# Patient Record
Sex: FEMALE | Race: WHITE | NOT HISPANIC OR LATINO
[De-identification: names, ages, dates, MRNs, and addresses within clinical notes are randomized per-mention and may not be internally consistent; named-entity substitution may affect disease eponyms.]

---

## 2017-01-09 ENCOUNTER — APPOINTMENT (OUTPATIENT)
Dept: PULMONOLOGY | Facility: CLINIC | Age: 62
End: 2017-01-09

## 2017-03-20 ENCOUNTER — APPOINTMENT (OUTPATIENT)
Dept: PULMONOLOGY | Facility: CLINIC | Age: 62
End: 2017-03-20

## 2017-06-05 ENCOUNTER — APPOINTMENT (OUTPATIENT)
Dept: PULMONOLOGY | Facility: CLINIC | Age: 62
End: 2017-06-05

## 2019-04-12 ENCOUNTER — EMERGENCY (EMERGENCY)
Facility: HOSPITAL | Age: 64
LOS: 1 days | Discharge: HOME | End: 2019-04-12
Attending: EMERGENCY MEDICINE | Admitting: EMERGENCY MEDICINE
Payer: MEDICARE

## 2019-04-12 VITALS
WEIGHT: 244.93 LBS | HEIGHT: 70 IN | RESPIRATION RATE: 20 BRPM | DIASTOLIC BLOOD PRESSURE: 84 MMHG | TEMPERATURE: 99 F | OXYGEN SATURATION: 94 % | HEART RATE: 89 BPM | SYSTOLIC BLOOD PRESSURE: 132 MMHG

## 2019-04-12 VITALS
SYSTOLIC BLOOD PRESSURE: 135 MMHG | TEMPERATURE: 98 F | HEART RATE: 81 BPM | DIASTOLIC BLOOD PRESSURE: 85 MMHG | RESPIRATION RATE: 18 BRPM | OXYGEN SATURATION: 96 %

## 2019-04-12 LAB
ALBUMIN SERPL ELPH-MCNC: 4.1 G/DL — SIGNIFICANT CHANGE UP (ref 3.3–5)
ALP SERPL-CCNC: 46 U/L — SIGNIFICANT CHANGE UP (ref 40–120)
ALT FLD-CCNC: 16 U/L — SIGNIFICANT CHANGE UP (ref 10–45)
ANION GAP SERPL CALC-SCNC: 9 MMOL/L — SIGNIFICANT CHANGE UP (ref 5–17)
AST SERPL-CCNC: 14 U/L — SIGNIFICANT CHANGE UP (ref 10–40)
BASOPHILS # BLD AUTO: 0.05 K/UL — SIGNIFICANT CHANGE UP (ref 0–0.2)
BASOPHILS NFR BLD AUTO: 0.5 % — SIGNIFICANT CHANGE UP (ref 0–2)
BILIRUB SERPL-MCNC: 0.3 MG/DL — SIGNIFICANT CHANGE UP (ref 0.2–1.2)
BUN SERPL-MCNC: 19 MG/DL — SIGNIFICANT CHANGE UP (ref 7–23)
CALCIUM SERPL-MCNC: 10.2 MG/DL — SIGNIFICANT CHANGE UP (ref 8.4–10.5)
CHLORIDE SERPL-SCNC: 108 MMOL/L — SIGNIFICANT CHANGE UP (ref 96–108)
CO2 SERPL-SCNC: 23 MMOL/L — SIGNIFICANT CHANGE UP (ref 22–31)
CREAT SERPL-MCNC: 0.63 MG/DL — SIGNIFICANT CHANGE UP (ref 0.5–1.3)
CRP SERPL-MCNC: 1.03 MG/DL — HIGH (ref 0–0.4)
EOSINOPHIL # BLD AUTO: 0.24 K/UL — SIGNIFICANT CHANGE UP (ref 0–0.5)
EOSINOPHIL NFR BLD AUTO: 2.2 % — SIGNIFICANT CHANGE UP (ref 0–6)
ERYTHROCYTE [SEDIMENTATION RATE] IN BLOOD: 21 MM/HR — SIGNIFICANT CHANGE UP
GLUCOSE SERPL-MCNC: 83 MG/DL — SIGNIFICANT CHANGE UP (ref 70–99)
HCT VFR BLD CALC: 45.8 % — HIGH (ref 34.5–45)
HGB BLD-MCNC: 15.3 G/DL — SIGNIFICANT CHANGE UP (ref 11.5–15.5)
IMM GRANULOCYTES NFR BLD AUTO: 0.5 % — SIGNIFICANT CHANGE UP (ref 0–1.5)
LYMPHOCYTES # BLD AUTO: 2.95 K/UL — SIGNIFICANT CHANGE UP (ref 1–3.3)
LYMPHOCYTES # BLD AUTO: 27.4 % — SIGNIFICANT CHANGE UP (ref 13–44)
MCHC RBC-ENTMCNC: 31.4 PG — SIGNIFICANT CHANGE UP (ref 27–34)
MCHC RBC-ENTMCNC: 33.4 GM/DL — SIGNIFICANT CHANGE UP (ref 32–36)
MCV RBC AUTO: 94 FL — SIGNIFICANT CHANGE UP (ref 80–100)
MONOCYTES # BLD AUTO: 0.87 K/UL — SIGNIFICANT CHANGE UP (ref 0–0.9)
MONOCYTES NFR BLD AUTO: 8.1 % — SIGNIFICANT CHANGE UP (ref 2–14)
NEUTROPHILS # BLD AUTO: 6.61 K/UL — SIGNIFICANT CHANGE UP (ref 1.8–7.4)
NEUTROPHILS NFR BLD AUTO: 61.3 % — SIGNIFICANT CHANGE UP (ref 43–77)
NRBC # BLD: 0 /100 WBCS — SIGNIFICANT CHANGE UP (ref 0–0)
PLATELET # BLD AUTO: 206 K/UL — SIGNIFICANT CHANGE UP (ref 150–400)
POTASSIUM SERPL-MCNC: 4.2 MMOL/L — SIGNIFICANT CHANGE UP (ref 3.5–5.3)
POTASSIUM SERPL-SCNC: 4.2 MMOL/L — SIGNIFICANT CHANGE UP (ref 3.5–5.3)
PROT SERPL-MCNC: 7.5 G/DL — SIGNIFICANT CHANGE UP (ref 6–8.3)
RBC # BLD: 4.87 M/UL — SIGNIFICANT CHANGE UP (ref 3.8–5.2)
RBC # FLD: 12.9 % — SIGNIFICANT CHANGE UP (ref 10.3–14.5)
SODIUM SERPL-SCNC: 140 MMOL/L — SIGNIFICANT CHANGE UP (ref 135–145)
URATE SERPL-MCNC: 4.4 MG/DL — SIGNIFICANT CHANGE UP (ref 2.5–7)
WBC # BLD: 10.77 K/UL — HIGH (ref 3.8–10.5)
WBC # FLD AUTO: 10.77 K/UL — HIGH (ref 3.8–10.5)

## 2019-04-12 PROCEDURE — 73562 X-RAY EXAM OF KNEE 3: CPT | Mod: 26,LT

## 2019-04-12 PROCEDURE — 96374 THER/PROPH/DIAG INJ IV PUSH: CPT

## 2019-04-12 PROCEDURE — 84550 ASSAY OF BLOOD/URIC ACID: CPT

## 2019-04-12 PROCEDURE — 85025 COMPLETE CBC W/AUTO DIFF WBC: CPT

## 2019-04-12 PROCEDURE — 99284 EMERGENCY DEPT VISIT MOD MDM: CPT | Mod: 25

## 2019-04-12 PROCEDURE — 93971 EXTREMITY STUDY: CPT | Mod: 26,LT

## 2019-04-12 PROCEDURE — 36415 COLL VENOUS BLD VENIPUNCTURE: CPT

## 2019-04-12 PROCEDURE — 99284 EMERGENCY DEPT VISIT MOD MDM: CPT

## 2019-04-12 PROCEDURE — 80053 COMPREHEN METABOLIC PANEL: CPT

## 2019-04-12 PROCEDURE — 85652 RBC SED RATE AUTOMATED: CPT

## 2019-04-12 PROCEDURE — 73502 X-RAY EXAM HIP UNI 2-3 VIEWS: CPT | Mod: 26,LT

## 2019-04-12 PROCEDURE — 73502 X-RAY EXAM HIP UNI 2-3 VIEWS: CPT

## 2019-04-12 PROCEDURE — 93971 EXTREMITY STUDY: CPT

## 2019-04-12 PROCEDURE — 86140 C-REACTIVE PROTEIN: CPT

## 2019-04-12 PROCEDURE — 73562 X-RAY EXAM OF KNEE 3: CPT

## 2019-04-12 RX ORDER — IBUPROFEN 200 MG
1 TABLET ORAL
Qty: 42 | Refills: 0
Start: 2019-04-12 | End: 2019-04-25

## 2019-04-12 RX ORDER — KETOROLAC TROMETHAMINE 30 MG/ML
15 SYRINGE (ML) INJECTION ONCE
Qty: 0 | Refills: 0 | Status: DISCONTINUED | OUTPATIENT
Start: 2019-04-12 | End: 2019-04-12

## 2019-04-12 RX ADMIN — Medication 15 MILLIGRAM(S): at 12:38

## 2019-04-12 NOTE — ED ADULT TRIAGE NOTE - CHIEF COMPLAINT QUOTE
pt c/o bilateral leg pain, swelling, but mostly to the left leg, with difficulty bearing weight for the past 5 days. pt reports tingling to the left leg. Has been evaluated multiple times but pain still continues. denies falls or recent injuries. pt c/o bilateral leg pain, swelling, but mostly to the left leg, with difficulty bearing weight for the past 5 days. pt reports tingling to the left leg. Has been evaluated multiple times but pain still continues. denies falls or recent injuries.f= pt c/o bilateral leg pain, swelling, but mostly to the left leg, with difficulty bearing weight for the past 5 days. pt reports tingling to the left leg. Has been evaluated multiple times but pain still continues. denies falls or recent injuries

## 2019-04-12 NOTE — ED PROVIDER NOTE - OBJECTIVE STATEMENT
64 y/o female with a PMHx of HTN, COPD and arthritis is present in the ED c/o left leg pain x1 week. Pt describes a constant aching pain that is located all throughout her left leg. The pain has worsen causing her difficulty with ambulating. She reports she had somewhat slightly similar problem to the right leg and was diagnosed with arthritis, however the pain on the left is worst. She denies the following: sob, chest pain, numbness/tingling, fall, injury, redness. Pt reports noting swelling located over her knee.

## 2019-04-12 NOTE — ED ADULT NURSE NOTE - OBJECTIVE STATEMENT
Patient is a 62yo female reporting left leg pain, swelling, and tingling for weeks, with increasing pain recently. Denies any falls/injuries. Patient unable to bear weight on left leg. Pedal pulses 2+ bilaterally. Patient is a 62yo female reporting left leg pain, swelling, and tingling for a week, with increasing pain in last few days. Denies any falls/injuries. Patient unable to bear weight on left leg. Pedal pulses 2+ bilaterally.

## 2019-04-12 NOTE — ED PROVIDER NOTE - CLINICAL SUMMARY MEDICAL DECISION MAKING FREE TEXT BOX
62 y/o female with left lower leg pain x1 week. VS nml. Labs nml. Xray mild arthritis, US negative for dvt. Pain treated and given cane for safe ambulation. Advised to follow-up with PMD for further.

## 2019-04-12 NOTE — ED ADULT NURSE NOTE - CHIEF COMPLAINT QUOTE
pt c/o bilateral leg pain, swelling, but mostly to the left leg, with difficulty bearing weight for the past 5 days. pt reports tingling to the left leg. Has been evaluated multiple times but pain still continues. denies falls or recent injuries

## 2019-04-12 NOTE — ED PROVIDER NOTE - NSFOLLOWUPINSTRUCTIONS_ED_ALL_ED_FT
Please follow up with your PMD. Return to the Emergency Department if you have any new or worsening symptoms, or if you have any concerns.    Leg Pain    WHAT YOU NEED TO KNOW:    Leg pain may be caused by a variety of health conditions. Your tests did not show any broken bones or blood clots.    DISCHARGE INSTRUCTIONS:    Return to the emergency department if:     You have a fever.      Your leg starts to swell.      Your leg pain gets worse.      You have numbness or tingling in your leg or toes.      You cannot put any weight on or move your leg.    Contact your healthcare provider if:     Your pain does not decrease, even after treatment.      You have questions or concerns about your condition or care.    Medicines:     NSAIDs, such as ibuprofen, help decrease swelling, pain, and fever. This medicine is available with or without a doctor's order. NSAIDs can cause stomach bleeding or kidney problems in certain people. If you take blood thinner medicine, always ask your healthcare provider if NSAIDs are safe for you. Always read the medicine label and follow directions.      Take your medicine as directed. Contact your healthcare provider if you think your medicine is not helping or if you have side effects. Tell him of her if you are allergic to any medicine. Keep a list of the medicines, vitamins, and herbs you take. Include the amounts, and when and why you take them. Bring the list or the pill bottles to follow-up visits. Carry your medicine list with you in case of an emergency.    Follow up with your healthcare provider as directed: You may need more tests to find the cause of your leg pain. You may need to see an orthopedic specialist or a physical therapist. Write down your questions so you remember to ask them during your visits.    Manage your leg pain:     Rest your injured leg so that it can heal. You may need an immobilizer, brace, or splint to limit the movement of your leg. You may need to avoid putting any weight on your leg for at least 48 hours. Return to normal activities as directed.      Ice the injury for 20 minutes every 4 hours for up to 24 hours, or as directed. Use an ice pack, or put crushed ice in a plastic bag. Cover it with a towel to protect your skin. Ice helps prevent tissue damage and decreases swelling and pain.      Elevate your injured leg above the level of your heart as often as you can. This will help decrease swelling and pain. If possible, prop your leg on pillows or blankets to keep the area elevated comfortably.       Use assistive devices as directed. You may need to use a cane or crutches. Assistive devices help decrease pain and pressure on your leg when you walk. Ask your healthcare provider for more information about assistive devices and how to use them correctly.      Maintain a healthy weight. Extra body weight can cause pressure and pain in your hip, knee, and ankle joints. Ask your healthcare provider how much you should weigh. Ask him to help you create a weight loss plan if you are overweight.

## 2019-04-12 NOTE — ED ADULT NURSE NOTE - INTERVENTIONS DEFINITIONS
Stretcher in lowest position, wheels locked, appropriate side rails in place/Physically safe environment: no spills, clutter or unnecessary equipment/Provide visual cue, wrist band, yellow gown, etc./Monitor gait and stability/Instruct patient to call for assistance

## 2019-04-16 DIAGNOSIS — M79.605 PAIN IN LEFT LEG: ICD-10-CM

## 2019-04-16 DIAGNOSIS — R26.2 DIFFICULTY IN WALKING, NOT ELSEWHERE CLASSIFIED: ICD-10-CM

## 2019-04-16 DIAGNOSIS — F17.210 NICOTINE DEPENDENCE, CIGARETTES, UNCOMPLICATED: ICD-10-CM

## 2019-04-16 DIAGNOSIS — I10 ESSENTIAL (PRIMARY) HYPERTENSION: ICD-10-CM

## 2019-04-16 DIAGNOSIS — M25.462 EFFUSION, LEFT KNEE: ICD-10-CM

## 2019-04-16 DIAGNOSIS — M79.662 PAIN IN LEFT LOWER LEG: ICD-10-CM

## 2019-06-03 PROBLEM — I10 ESSENTIAL (PRIMARY) HYPERTENSION: Chronic | Status: ACTIVE | Noted: 2019-04-12

## 2019-06-07 ENCOUNTER — APPOINTMENT (OUTPATIENT)
Dept: INTERNAL MEDICINE | Facility: CLINIC | Age: 64
End: 2019-06-07
Payer: MEDICARE

## 2019-06-07 VITALS
TEMPERATURE: 98.2 F | HEART RATE: 97 BPM | HEIGHT: 70 IN | DIASTOLIC BLOOD PRESSURE: 80 MMHG | SYSTOLIC BLOOD PRESSURE: 122 MMHG | WEIGHT: 270 LBS | OXYGEN SATURATION: 95 % | RESPIRATION RATE: 14 BRPM | BODY MASS INDEX: 38.65 KG/M2

## 2019-06-07 PROCEDURE — 99213 OFFICE O/P EST LOW 20 MIN: CPT | Mod: 25

## 2019-06-07 PROCEDURE — 36415 COLL VENOUS BLD VENIPUNCTURE: CPT

## 2019-06-07 PROCEDURE — 99203 OFFICE O/P NEW LOW 30 MIN: CPT | Mod: 25

## 2019-06-08 NOTE — HISTORY OF PRESENT ILLNESS
[FreeTextEntry1] : pt here for checkup.\par complaining of pain of the knees. [de-identified] : 64 yo wf with hx of HTN,hyperlipidemia ,osteoarthritis and depression, came to the office complaining of pain of the knees mostly left knee.pt was seen and f/u  by orthopedic.

## 2019-06-08 NOTE — PHYSICAL EXAM
[No Acute Distress] : no acute distress [Well Nourished] : well nourished [Well Developed] : well developed [Normal Sclera/Conjunctiva] : normal sclera/conjunctiva [Well-Appearing] : well-appearing [PERRL] : pupils equal round and reactive to light [Normal Oropharynx] : the oropharynx was normal [Normal Outer Ear/Nose] : the outer ears and nose were normal in appearance [EOMI] : extraocular movements intact [Supple] : supple [No Lymphadenopathy] : no lymphadenopathy [No JVD] : no jugular venous distention [No Respiratory Distress] : no respiratory distress  [Clear to Auscultation] : lungs were clear to auscultation bilaterally [Thyroid Normal, No Nodules] : the thyroid was normal and there were no nodules present [No Accessory Muscle Use] : no accessory muscle use [Normal Rate] : normal rate  [Regular Rhythm] : with a regular rhythm [Normal S1, S2] : normal S1 and S2 [No Carotid Bruits] : no carotid bruits [No Murmur] : no murmur heard [No Abdominal Bruit] : a ~M bruit was not heard ~T in the abdomen [Pedal Pulses Present] : the pedal pulses are present [No Edema] : there was no peripheral edema [No Varicosities] : no varicosities [No Extremity Clubbing/Cyanosis] : no extremity clubbing/cyanosis [Normal Appearance] : normal in appearance [No Palpable Aorta] : no palpable aorta [No Nipple Discharge] : no nipple discharge [No Axillary Lymphadenopathy] : no axillary lymphadenopathy [Non Tender] : non-tender [No Masses] : no abdominal mass palpated [Soft] : abdomen soft [Non-distended] : non-distended [No HSM] : no HSM [Normal Bowel Sounds] : normal bowel sounds [Normal Posterior Cervical Nodes] : no posterior cervical lymphadenopathy [Normal Anterior Cervical Nodes] : no anterior cervical lymphadenopathy [No CVA Tenderness] : no CVA  tenderness [Grossly Normal Strength/Tone] : grossly normal strength/tone [No Rash] : no rash [No Spinal Tenderness] : no spinal tenderness [Coordination Grossly Intact] : coordination grossly intact [Normal Gait] : normal gait [No Focal Deficits] : no focal deficits [Deep Tendon Reflexes (DTR)] : deep tendon reflexes were 2+ and symmetric [Normal Affect] : the affect was normal [Normal Insight/Judgement] : insight and judgment were intact [FreeTextEntry1] : deferred [de-identified] : obese [de-identified] : swelling left knee

## 2019-06-08 NOTE — HEALTH RISK ASSESSMENT
[Good] : ~his/her~  mood as  good [No falls in past year] : Patient reported no falls in the past year [1] : 1) Little interest or pleasure doing things for several days (1) [0] : 2) Feeling down, depressed, or hopeless: Not at all (0) [] : No [de-identified] : none [de-identified] : low fat, low salt

## 2019-06-11 LAB
ALBUMIN SERPL ELPH-MCNC: 4.2 G/DL
ALP BLD-CCNC: 46 U/L
ALT SERPL-CCNC: 15 U/L
ANION GAP SERPL CALC-SCNC: 12 MMOL/L
APPEARANCE: CLEAR
AST SERPL-CCNC: 16 U/L
BACTERIA: NEGATIVE
BASOPHILS # BLD AUTO: 0.03 K/UL
BASOPHILS NFR BLD AUTO: 0.3 %
BILIRUB SERPL-MCNC: 0.3 MG/DL
BILIRUBIN URINE: NEGATIVE
BLOOD URINE: NEGATIVE
BUN SERPL-MCNC: 23 MG/DL
CALCIUM SERPL-MCNC: 10.3 MG/DL
CHLORIDE SERPL-SCNC: 106 MMOL/L
CHOLEST SERPL-MCNC: 167 MG/DL
CHOLEST/HDLC SERPL: 3.9 RATIO
CO2 SERPL-SCNC: 25 MMOL/L
COLOR: YELLOW
CREAT SERPL-MCNC: 0.83 MG/DL
EOSINOPHIL # BLD AUTO: 0.17 K/UL
EOSINOPHIL NFR BLD AUTO: 1.6 %
GLUCOSE QUALITATIVE U: NEGATIVE
GLUCOSE SERPL-MCNC: 103 MG/DL
HCT VFR BLD CALC: 46 %
HDLC SERPL-MCNC: 43 MG/DL
HGB BLD-MCNC: 14.3 G/DL
HYALINE CASTS: 0 /LPF
IMM GRANULOCYTES NFR BLD AUTO: 0.4 %
KETONES URINE: NORMAL
LDLC SERPL CALC-MCNC: 94 MG/DL
LEUKOCYTE ESTERASE URINE: NEGATIVE
LYMPHOCYTES # BLD AUTO: 2.69 K/UL
LYMPHOCYTES NFR BLD AUTO: 25.8 %
MAN DIFF?: NORMAL
MCHC RBC-ENTMCNC: 30.7 PG
MCHC RBC-ENTMCNC: 31.1 GM/DL
MCV RBC AUTO: 98.7 FL
MICROSCOPIC-UA: NORMAL
MONOCYTES # BLD AUTO: 0.79 K/UL
MONOCYTES NFR BLD AUTO: 7.6 %
NEUTROPHILS # BLD AUTO: 6.71 K/UL
NEUTROPHILS NFR BLD AUTO: 64.3 %
NITRITE URINE: NEGATIVE
PH URINE: 6
PLATELET # BLD AUTO: 239 K/UL
POTASSIUM SERPL-SCNC: 4.3 MMOL/L
PROT SERPL-MCNC: 6.6 G/DL
PROTEIN URINE: ABNORMAL
RBC # BLD: 4.66 M/UL
RBC # FLD: 13.9 %
RED BLOOD CELLS URINE: 1 /HPF
SODIUM SERPL-SCNC: 143 MMOL/L
SPECIFIC GRAVITY URINE: 1.03
SQUAMOUS EPITHELIAL CELLS: 7 /HPF
TRIGL SERPL-MCNC: 152 MG/DL
UROBILINOGEN URINE: ABNORMAL
WBC # FLD AUTO: 10.43 K/UL
WHITE BLOOD CELLS URINE: 3 /HPF

## 2019-06-19 RX ORDER — FLUOXETINE HYDROCHLORIDE 40 MG/1
40 CAPSULE ORAL
Qty: 90 | Refills: 3 | Status: ACTIVE | COMMUNITY
Start: 1900-01-01 | End: 1900-01-01

## 2019-10-29 ENCOUNTER — APPOINTMENT (OUTPATIENT)
Dept: INTERNAL MEDICINE | Facility: CLINIC | Age: 64
End: 2019-10-29
Payer: MEDICARE

## 2019-10-29 VITALS
TEMPERATURE: 99.4 F | WEIGHT: 277 LBS | HEIGHT: 70 IN | BODY MASS INDEX: 39.65 KG/M2 | SYSTOLIC BLOOD PRESSURE: 135 MMHG | HEART RATE: 89 BPM | OXYGEN SATURATION: 93 % | DIASTOLIC BLOOD PRESSURE: 81 MMHG | RESPIRATION RATE: 15 BRPM

## 2019-10-29 PROCEDURE — 99214 OFFICE O/P EST MOD 30 MIN: CPT

## 2019-10-29 NOTE — HISTORY OF PRESENT ILLNESS
[FreeTextEntry8] : 63 YO WF CAME TO THE OFFICE COMPLAINING OF A HEADACHE FOR ONE WEEK. PT STATES THAT SHE DIDNT TAKE HER MEDICATIONS FOR HIGH BLOOD PRESSURE FOR ONE WEEK

## 2020-01-27 ENCOUNTER — APPOINTMENT (OUTPATIENT)
Dept: INTERNAL MEDICINE | Facility: CLINIC | Age: 65
End: 2020-01-27

## 2020-05-19 ENCOUNTER — NON-APPOINTMENT (OUTPATIENT)
Age: 65
End: 2020-05-19

## 2020-05-19 ENCOUNTER — APPOINTMENT (OUTPATIENT)
Dept: INTERNAL MEDICINE | Facility: CLINIC | Age: 65
End: 2020-05-19
Payer: MEDICARE

## 2020-05-19 VITALS
WEIGHT: 286 LBS | SYSTOLIC BLOOD PRESSURE: 130 MMHG | RESPIRATION RATE: 15 BRPM | DIASTOLIC BLOOD PRESSURE: 87 MMHG | OXYGEN SATURATION: 94 % | HEIGHT: 70 IN | TEMPERATURE: 98.6 F | BODY MASS INDEX: 40.94 KG/M2 | HEART RATE: 89 BPM

## 2020-05-19 PROCEDURE — G0439: CPT

## 2020-05-19 PROCEDURE — 93000 ELECTROCARDIOGRAM COMPLETE: CPT

## 2020-05-19 PROCEDURE — 36415 COLL VENOUS BLD VENIPUNCTURE: CPT

## 2020-05-20 NOTE — HISTORY OF PRESENT ILLNESS
[FreeTextEntry1] : PHYSICAL \par NO SPECIFIC COMPLAINTS [de-identified] : 65 YO WF WITH HX OF HTN,HYPERLIPIDEMIA,COPD,ANXIETY DEPRESSION ,OSTEOARTHRITIS, CAME TO THE OFFICE FOR PHYSICAL\par

## 2020-05-20 NOTE — PHYSICAL EXAM
[No Acute Distress] : no acute distress [Well Nourished] : well nourished [Well Developed] : well developed [Well-Appearing] : well-appearing [EOMI] : extraocular movements intact [Normal Sclera/Conjunctiva] : normal sclera/conjunctiva [PERRL] : pupils equal round and reactive to light [Normal Outer Ear/Nose] : the outer ears and nose were normal in appearance [Normal Oropharynx] : the oropharynx was normal [No JVD] : no jugular venous distention [Supple] : supple [No Lymphadenopathy] : no lymphadenopathy [Thyroid Normal, No Nodules] : the thyroid was normal and there were no nodules present [No Respiratory Distress] : no respiratory distress  [No Accessory Muscle Use] : no accessory muscle use [Clear to Auscultation] : lungs were clear to auscultation bilaterally [Normal Rate] : normal rate  [Regular Rhythm] : with a regular rhythm [Normal S1, S2] : normal S1 and S2 [No Murmur] : no murmur heard [No Carotid Bruits] : no carotid bruits [No Varicosities] : no varicosities [No Abdominal Bruit] : a ~M bruit was not heard ~T in the abdomen [Pedal Pulses Present] : the pedal pulses are present [No Edema] : there was no peripheral edema [No Palpable Aorta] : no palpable aorta [Normal Appearance] : normal in appearance [No Extremity Clubbing/Cyanosis] : no extremity clubbing/cyanosis [No Nipple Discharge] : no nipple discharge [No Axillary Lymphadenopathy] : no axillary lymphadenopathy [Non Tender] : non-tender [Soft] : abdomen soft [Non-distended] : non-distended [No Masses] : no abdominal mass palpated [Normal Bowel Sounds] : normal bowel sounds [No HSM] : no HSM [Normal Posterior Cervical Nodes] : no posterior cervical lymphadenopathy [Normal Anterior Cervical Nodes] : no anterior cervical lymphadenopathy [No CVA Tenderness] : no CVA  tenderness [No Joint Swelling] : no joint swelling [No Spinal Tenderness] : no spinal tenderness [Grossly Normal Strength/Tone] : grossly normal strength/tone [No Rash] : no rash [Coordination Grossly Intact] : coordination grossly intact [Normal Gait] : normal gait [No Focal Deficits] : no focal deficits [Deep Tendon Reflexes (DTR)] : deep tendon reflexes were 2+ and symmetric [Normal Affect] : the affect was normal [Normal Insight/Judgement] : insight and judgment were intact [FreeTextEntry1] : DEFERRED

## 2020-05-20 NOTE — HEALTH RISK ASSESSMENT
[Good] : ~his/her~  mood as  good [] : Yes [No falls in past year] : Patient reported no falls in the past year [No] : In the past 12 months have you used drugs other than those required for medical reasons? No [0] : 2) Feeling down, depressed, or hopeless: Not at all (0) [Audit-CScore] : 0

## 2020-05-22 LAB
ALBUMIN SERPL ELPH-MCNC: 4.6 G/DL
ALP BLD-CCNC: 56 U/L
ALT SERPL-CCNC: 18 U/L
ANION GAP SERPL CALC-SCNC: 12 MMOL/L
APPEARANCE: ABNORMAL
AST SERPL-CCNC: 14 U/L
BACTERIA: ABNORMAL
BASOPHILS # BLD AUTO: 0.06 K/UL
BASOPHILS NFR BLD AUTO: 0.5 %
BILIRUB SERPL-MCNC: 0.4 MG/DL
BILIRUBIN URINE: NEGATIVE
BLOOD URINE: ABNORMAL
BUN SERPL-MCNC: 14 MG/DL
CALCIUM SERPL-MCNC: 9.9 MG/DL
CHLORIDE SERPL-SCNC: 105 MMOL/L
CHOLEST SERPL-MCNC: 218 MG/DL
CHOLEST/HDLC SERPL: 5.1 RATIO
CO2 SERPL-SCNC: 25 MMOL/L
COLOR: YELLOW
CREAT SERPL-MCNC: 0.62 MG/DL
EOSINOPHIL # BLD AUTO: 0.23 K/UL
EOSINOPHIL NFR BLD AUTO: 2 %
ESTIMATED AVERAGE GLUCOSE: 134 MG/DL
GLUCOSE BS SERPL-MCNC: 77 MG/DL
GLUCOSE QUALITATIVE U: NEGATIVE
GLUCOSE SERPL-MCNC: 93 MG/DL
HBA1C MFR BLD HPLC: 6.3 %
HCT VFR BLD CALC: 50.9 %
HDLC SERPL-MCNC: 43 MG/DL
HGB BLD-MCNC: 16.3 G/DL
HYALINE CASTS: 2 /LPF
IMM GRANULOCYTES NFR BLD AUTO: 0.2 %
KETONES URINE: NEGATIVE
LDLC SERPL CALC-MCNC: 128 MG/DL
LEUKOCYTE ESTERASE URINE: ABNORMAL
LYMPHOCYTES # BLD AUTO: 3.64 K/UL
LYMPHOCYTES NFR BLD AUTO: 31.9 %
MAN DIFF?: NORMAL
MCHC RBC-ENTMCNC: 30.6 PG
MCHC RBC-ENTMCNC: 32 GM/DL
MCV RBC AUTO: 95.7 FL
MICROSCOPIC-UA: NORMAL
MONOCYTES # BLD AUTO: 0.85 K/UL
MONOCYTES NFR BLD AUTO: 7.5 %
NEUTROPHILS # BLD AUTO: 6.6 K/UL
NEUTROPHILS NFR BLD AUTO: 57.9 %
NITRITE URINE: NEGATIVE
PH URINE: 6.5
PLATELET # BLD AUTO: 258 K/UL
POTASSIUM SERPL-SCNC: 4.4 MMOL/L
PROT SERPL-MCNC: 7.3 G/DL
PROTEIN URINE: ABNORMAL
RBC # BLD: 5.32 M/UL
RBC # FLD: 13.7 %
RED BLOOD CELLS URINE: 10 /HPF
SARS-COV-2 IGG SERPL IA-ACNC: 0.1 INDEX
SARS-COV-2 IGG SERPL QL IA: NEGATIVE
SODIUM SERPL-SCNC: 142 MMOL/L
SPECIFIC GRAVITY URINE: 1.01
SQUAMOUS EPITHELIAL CELLS: 18 /HPF
T3 SERPL-MCNC: 135 NG/DL
T4 FREE SERPL-MCNC: 1 NG/DL
T4 SERPL-MCNC: 8.1 UG/DL
TRIGL SERPL-MCNC: 234 MG/DL
TSH SERPL-ACNC: 0.59 UIU/ML
UROBILINOGEN URINE: NORMAL
WBC # FLD AUTO: 11.4 K/UL
WHITE BLOOD CELLS URINE: 87 /HPF

## 2020-05-26 DIAGNOSIS — R82.90 UNSPECIFIED ABNORMAL FINDINGS IN URINE: ICD-10-CM

## 2020-05-28 LAB
APPEARANCE: ABNORMAL
BACTERIA UR CULT: ABNORMAL
BACTERIA: ABNORMAL
BILIRUBIN URINE: NEGATIVE
BLOOD URINE: ABNORMAL
COLOR: YELLOW
GLUCOSE QUALITATIVE U: NEGATIVE
HYALINE CASTS: 0 /LPF
KETONES URINE: NEGATIVE
LEUKOCYTE ESTERASE URINE: ABNORMAL
MICROSCOPIC-UA: NORMAL
NITRITE URINE: NEGATIVE
PH URINE: 6.5
PROTEIN URINE: ABNORMAL
RED BLOOD CELLS URINE: 11 /HPF
SPECIFIC GRAVITY URINE: 1.02
SQUAMOUS EPITHELIAL CELLS: 6 /HPF
UROBILINOGEN URINE: ABNORMAL
WHITE BLOOD CELLS URINE: 39 /HPF

## 2020-05-29 DIAGNOSIS — Z87.440 PERSONAL HISTORY OF URINARY (TRACT) INFECTIONS: ICD-10-CM

## 2020-06-11 ENCOUNTER — RX RENEWAL (OUTPATIENT)
Age: 65
End: 2020-06-11

## 2020-12-21 ENCOUNTER — APPOINTMENT (OUTPATIENT)
Dept: INTERNAL MEDICINE | Facility: CLINIC | Age: 65
End: 2020-12-21

## 2020-12-23 PROBLEM — Z87.440 HISTORY OF URINARY TRACT INFECTION: Status: RESOLVED | Noted: 2020-05-29 | Resolved: 2020-12-23

## 2020-12-30 ENCOUNTER — APPOINTMENT (OUTPATIENT)
Dept: INTERNAL MEDICINE | Facility: CLINIC | Age: 65
End: 2020-12-30
Payer: MEDICARE

## 2020-12-30 VITALS
TEMPERATURE: 98.4 F | BODY MASS INDEX: 40.37 KG/M2 | RESPIRATION RATE: 15 BRPM | WEIGHT: 282 LBS | OXYGEN SATURATION: 95 % | HEIGHT: 70 IN | HEART RATE: 93 BPM | DIASTOLIC BLOOD PRESSURE: 81 MMHG | SYSTOLIC BLOOD PRESSURE: 137 MMHG

## 2020-12-30 DIAGNOSIS — Z11.59 ENCOUNTER FOR SCREENING FOR OTHER VIRAL DISEASES: ICD-10-CM

## 2020-12-30 PROCEDURE — 99214 OFFICE O/P EST MOD 30 MIN: CPT | Mod: CS

## 2020-12-31 LAB — SARS-COV-2 N GENE NPH QL NAA+PROBE: NOT DETECTED

## 2021-01-02 NOTE — HEALTH RISK ASSESSMENT
[] : Yes [No] : In the past 12 months have you used drugs other than those required for medical reasons? No [No falls in past year] : Patient reported no falls in the past year [0] : 2) Feeling down, depressed, or hopeless: Not at all (0) [1] : 1) Little interest or pleasure doing things for several days (1) [Audit-CScore] : 0 [de-identified] : Sedentary [de-identified] : Regular [[id number]] : 1

## 2021-01-02 NOTE — END OF VISIT
[FreeTextEntry3] : I, Kristyn Aj, personally transcribed these services in the presence of Dr. Yeison Combs MD. I, Dr. Yeison Combs MD, personally performed the services described in this documentation as scribed by Kristyn Aj in my presence and it is both accurate and complete.\par

## 2021-01-02 NOTE — PHYSICAL EXAM
[No Acute Distress] : no acute distress [Well Nourished] : well nourished [Well Developed] : well developed [Well-Appearing] : well-appearing [Normal Sclera/Conjunctiva] : normal sclera/conjunctiva [PERRL] : pupils equal round and reactive to light [EOMI] : extraocular movements intact [Normal Outer Ear/Nose] : the outer ears and nose were normal in appearance [Normal Oropharynx] : the oropharynx was normal [No JVD] : no jugular venous distention [No Lymphadenopathy] : no lymphadenopathy [Supple] : supple [Thyroid Normal, No Nodules] : the thyroid was normal and there were no nodules present [No Respiratory Distress] : no respiratory distress  [No Accessory Muscle Use] : no accessory muscle use [Clear to Auscultation] : lungs were clear to auscultation bilaterally [Normal Rate] : normal rate  [Regular Rhythm] : with a regular rhythm [Normal S1, S2] : normal S1 and S2 [No Murmur] : no murmur heard [No Carotid Bruits] : no carotid bruits [No Abdominal Bruit] : a ~M bruit was not heard ~T in the abdomen [No Varicosities] : no varicosities [Pedal Pulses Present] : the pedal pulses are present [No Edema] : there was no peripheral edema [No Palpable Aorta] : no palpable aorta [No Extremity Clubbing/Cyanosis] : no extremity clubbing/cyanosis [Soft] : abdomen soft [Non Tender] : non-tender [Non-distended] : non-distended [No Masses] : no abdominal mass palpated [No HSM] : no HSM [Normal Bowel Sounds] : normal bowel sounds [Normal Posterior Cervical Nodes] : no posterior cervical lymphadenopathy [Normal Anterior Cervical Nodes] : no anterior cervical lymphadenopathy [No CVA Tenderness] : no CVA  tenderness [No Spinal Tenderness] : no spinal tenderness [No Joint Swelling] : no joint swelling [Grossly Normal Strength/Tone] : grossly normal strength/tone [No Rash] : no rash [Coordination Grossly Intact] : coordination grossly intact [No Focal Deficits] : no focal deficits [Normal Gait] : normal gait [Normal Affect] : the affect was normal [Normal Insight/Judgement] : insight and judgment were intact [de-identified] : obese [de-identified] : Deferred [FreeTextEntry1] : Deferred

## 2021-01-02 NOTE — HISTORY OF PRESENT ILLNESS
[FreeTextEntry1] : Follow-up\par  [de-identified] : EVAN MONTOYA is a 65 year old female with a PMHx of OAD, OA, HTN, HLD, COVID-19 virus infection obesity, obstructive sleep apnea on CPAP, depression, and pneumonia who presents today for follow-up. \par \par Pt was hospitalized at [facility] for a week for COVID-19 virus infection, Pneumonia, and COPD. She was discharged home on 12/26/2020.

## 2021-01-07 ENCOUNTER — APPOINTMENT (OUTPATIENT)
Dept: INTERNAL MEDICINE | Facility: CLINIC | Age: 66
End: 2021-01-07

## 2021-01-07 ENCOUNTER — APPOINTMENT (OUTPATIENT)
Dept: HEART AND VASCULAR | Facility: CLINIC | Age: 66
End: 2021-01-07

## 2021-01-07 ENCOUNTER — APPOINTMENT (OUTPATIENT)
Dept: PULMONOLOGY | Facility: CLINIC | Age: 66
End: 2021-01-07
Payer: MEDICARE

## 2021-01-07 ENCOUNTER — APPOINTMENT (OUTPATIENT)
Dept: INTERNAL MEDICINE | Facility: CLINIC | Age: 66
End: 2021-01-07
Payer: MEDICARE

## 2021-01-07 VITALS
DIASTOLIC BLOOD PRESSURE: 78 MMHG | HEART RATE: 114 BPM | SYSTOLIC BLOOD PRESSURE: 151 MMHG | BODY MASS INDEX: 40.37 KG/M2 | RESPIRATION RATE: 16 BRPM | TEMPERATURE: 98 F | HEIGHT: 70 IN | OXYGEN SATURATION: 94 % | WEIGHT: 282 LBS

## 2021-01-07 DIAGNOSIS — N39.0 URINARY TRACT INFECTION, SITE NOT SPECIFIED: ICD-10-CM

## 2021-01-07 PROCEDURE — 99214 OFFICE O/P EST MOD 30 MIN: CPT | Mod: CS,25

## 2021-01-07 PROCEDURE — 99214 OFFICE O/P EST MOD 30 MIN: CPT | Mod: CS

## 2021-01-07 PROCEDURE — 36415 COLL VENOUS BLD VENIPUNCTURE: CPT

## 2021-01-07 NOTE — REASON FOR VISIT
[Follow-Up - From Hospitalization] : a follow-up visit after a recent hospitalization [Sleep Apnea] : sleep apnea [Cough] : cough [COPD] : COPD [Shortness of Breath] : shortness of breath [Wheezing] : wheezing

## 2021-01-07 NOTE — REVIEW OF SYSTEMS
[Cough] : cough [Hemoptysis] : no hemoptysis [Chest Tightness] : chest tightness [Frequent URIs] : no frequent URIs [Sputum] : sputum [Dyspnea] : dyspnea [Pleuritic Pain] : pleuritic pain [Wheezing] : wheezing [A.M. Dry Mouth] : a.m. dry mouth [SOB on Exertion] : sob on exertion [Negative] : Endocrine

## 2021-01-07 NOTE — HISTORY OF PRESENT ILLNESS
[Current] : current [Never] : never [TextBox_4] : Patient is a 65-year-old morbidly obese female with past medical history significant for COPD, active smoker, hypertension, high cholesterol who was recently discharged from the hospital after a prolonged admission secondary to COVID-19 pneumonia.  The patient presents today for follow-up.  She continues to smoke despite having home oxygen.  She complains of occasional cough shortness of breath and dyspnea on exertion.  She denies any fevers or myalgias

## 2021-01-07 NOTE — ASSESSMENT
[FreeTextEntry1] : Summary the patient is a 65-year-old morbidly obese female with past medical history significant for obstructive sleep apnea, COPD, hypertension, GERD status post COVID-19 pneumonia who presents today for follow-up.  The patient's physical exam is significant for diffuse rhonchi bilaterally.  I had a lengthy discussion with the patient regarding smoking cessation.  Patient is instructed to continue current medications and to follow-up in 3 months.  Prescription renewal performed

## 2021-01-10 NOTE — HEALTH RISK ASSESSMENT
[] : Yes [No] : In the past 12 months have you used drugs other than those required for medical reasons? No [No falls in past year] : Patient reported no falls in the past year [1] : 1) Little interest or pleasure doing things for several days (1) [0] : 2) Feeling down, depressed, or hopeless: Not at all (0) [de-identified] : 6 cigarettes per day [Audit-CScore] : 0 [de-identified] : Sedentary [de-identified] : Regular [[id number]] : 1

## 2021-01-10 NOTE — PHYSICAL EXAM
[No Acute Distress] : no acute distress [Well Nourished] : well nourished [Well Developed] : well developed [Well-Appearing] : well-appearing [Normal Sclera/Conjunctiva] : normal sclera/conjunctiva [PERRL] : pupils equal round and reactive to light [EOMI] : extraocular movements intact [Normal Outer Ear/Nose] : the outer ears and nose were normal in appearance [Normal Oropharynx] : the oropharynx was normal [No JVD] : no jugular venous distention [No Lymphadenopathy] : no lymphadenopathy [Supple] : supple [Thyroid Normal, No Nodules] : the thyroid was normal and there were no nodules present [No Respiratory Distress] : no respiratory distress  [No Accessory Muscle Use] : no accessory muscle use [Clear to Auscultation] : lungs were clear to auscultation bilaterally [Normal Rate] : normal rate  [Regular Rhythm] : with a regular rhythm [Normal S1, S2] : normal S1 and S2 [No Murmur] : no murmur heard [No Carotid Bruits] : no carotid bruits [No Abdominal Bruit] : a ~M bruit was not heard ~T in the abdomen [No Varicosities] : no varicosities [Pedal Pulses Present] : the pedal pulses are present [No Edema] : there was no peripheral edema [No Palpable Aorta] : no palpable aorta [No Extremity Clubbing/Cyanosis] : no extremity clubbing/cyanosis [Soft] : abdomen soft [Non Tender] : non-tender [Non-distended] : non-distended [No Masses] : no abdominal mass palpated [No HSM] : no HSM [Normal Bowel Sounds] : normal bowel sounds [Normal Posterior Cervical Nodes] : no posterior cervical lymphadenopathy [Normal Anterior Cervical Nodes] : no anterior cervical lymphadenopathy [No CVA Tenderness] : no CVA  tenderness [No Spinal Tenderness] : no spinal tenderness [No Joint Swelling] : no joint swelling [Grossly Normal Strength/Tone] : grossly normal strength/tone [No Rash] : no rash [Coordination Grossly Intact] : coordination grossly intact [No Focal Deficits] : no focal deficits [Normal Gait] : normal gait [Normal Affect] : the affect was normal [Normal Insight/Judgement] : insight and judgment were intact [de-identified] : Deferred [de-identified] : Obese [FreeTextEntry1] : Deferred

## 2021-01-10 NOTE — HISTORY OF PRESENT ILLNESS
[FreeTextEntry1] : Follow-up\par  [de-identified] : EVAN MONTOYA is a 65 year old female with a PMHx of HTN, HLD, OAD, OA, Covid-19 virus infection, depression, obesity, pneumonia, and sleep apnea on CPAP who presents today for follow-up post COVID-19 virus infection.\par \par Pt was seen by pulmonary consultant today and was given Spiriva Respimat 2.5 mcg/ actuation samples.\par

## 2021-01-16 LAB
25(OH)D3 SERPL-MCNC: 29.4 NG/ML
ALBUMIN SERPL ELPH-MCNC: 3.8 G/DL
ALP BLD-CCNC: 50 U/L
ALT SERPL-CCNC: 22 U/L
ANION GAP SERPL CALC-SCNC: 14 MMOL/L
APPEARANCE: ABNORMAL
AST SERPL-CCNC: 14 U/L
BACTERIA UR CULT: ABNORMAL
BACTERIA: ABNORMAL
BASOPHILS # BLD AUTO: 0.04 K/UL
BASOPHILS NFR BLD AUTO: 0.5 %
BILIRUB SERPL-MCNC: 0.3 MG/DL
BILIRUBIN URINE: NEGATIVE
BLOOD URINE: ABNORMAL
BUN SERPL-MCNC: 15 MG/DL
CALCIUM OXALATE CRYSTALS: ABNORMAL
CALCIUM SERPL-MCNC: 10.2 MG/DL
CHLORIDE SERPL-SCNC: 103 MMOL/L
CHOLEST SERPL-MCNC: 229 MG/DL
CO2 SERPL-SCNC: 23 MMOL/L
COLOR: YELLOW
CREAT SERPL-MCNC: 0.89 MG/DL
EOSINOPHIL # BLD AUTO: 0.19 K/UL
EOSINOPHIL NFR BLD AUTO: 2.6 %
GLUCOSE QUALITATIVE U: NEGATIVE
GLUCOSE SERPL-MCNC: 164 MG/DL
HCT VFR BLD CALC: 43.6 %
HDLC SERPL-MCNC: 38 MG/DL
HGB BLD-MCNC: 14.3 G/DL
HYALINE CASTS: 0 /LPF
IMM GRANULOCYTES NFR BLD AUTO: 0.3 %
KETONES URINE: NEGATIVE
LDLC SERPL CALC-MCNC: 133 MG/DL
LEUKOCYTE ESTERASE URINE: ABNORMAL
LYMPHOCYTES # BLD AUTO: 2.62 K/UL
LYMPHOCYTES NFR BLD AUTO: 35.7 %
MAN DIFF?: NORMAL
MCHC RBC-ENTMCNC: 31 PG
MCHC RBC-ENTMCNC: 32.8 GM/DL
MCV RBC AUTO: 94.4 FL
MICROSCOPIC-UA: NORMAL
MONOCYTES # BLD AUTO: 0.61 K/UL
MONOCYTES NFR BLD AUTO: 8.3 %
NEUTROPHILS # BLD AUTO: 3.85 K/UL
NEUTROPHILS NFR BLD AUTO: 52.6 %
NITRITE URINE: POSITIVE
NONHDLC SERPL-MCNC: 192 MG/DL
PH URINE: 6
PLATELET # BLD AUTO: 199 K/UL
POTASSIUM SERPL-SCNC: 4.2 MMOL/L
PROT SERPL-MCNC: 7 G/DL
PROTEIN URINE: NORMAL
RBC # BLD: 4.62 M/UL
RBC # FLD: 13.5 %
RED BLOOD CELLS URINE: 6 /HPF
SODIUM SERPL-SCNC: 140 MMOL/L
SPECIFIC GRAVITY URINE: 1.02
SQUAMOUS EPITHELIAL CELLS: 8 /HPF
T3 SERPL-MCNC: 139 NG/DL
T4 FREE SERPL-MCNC: 1.2 NG/DL
T4 SERPL-MCNC: 7.8 UG/DL
TRIGL SERPL-MCNC: 291 MG/DL
TSH SERPL-ACNC: 0.51 UIU/ML
UROBILINOGEN URINE: NORMAL
VIT B12 SERPL-MCNC: 378 PG/ML
WBC # FLD AUTO: 7.33 K/UL
WHITE BLOOD CELLS URINE: 12 /HPF

## 2021-03-12 ENCOUNTER — NON-APPOINTMENT (OUTPATIENT)
Age: 66
End: 2021-03-12

## 2021-03-23 ENCOUNTER — APPOINTMENT (OUTPATIENT)
Dept: PULMONOLOGY | Facility: CLINIC | Age: 66
End: 2021-03-23
Payer: MEDICARE

## 2021-03-23 VITALS
HEART RATE: 96 BPM | RESPIRATION RATE: 16 BRPM | OXYGEN SATURATION: 93 % | TEMPERATURE: 96 F | WEIGHT: 293 LBS | BODY MASS INDEX: 41.95 KG/M2 | DIASTOLIC BLOOD PRESSURE: 91 MMHG | HEIGHT: 70 IN | SYSTOLIC BLOOD PRESSURE: 146 MMHG

## 2021-03-23 DIAGNOSIS — J12.82 COVID-19: ICD-10-CM

## 2021-03-23 DIAGNOSIS — U07.1 COVID-19: ICD-10-CM

## 2021-03-23 PROCEDURE — 99214 OFFICE O/P EST MOD 30 MIN: CPT | Mod: CS

## 2021-03-23 NOTE — REASON FOR VISIT
[Follow-Up] : a follow-up visit [Sleep Apnea] : sleep apnea [Cough] : cough [Shortness of Breath] : shortness of breath

## 2021-03-23 NOTE — ASSESSMENT
[FreeTextEntry1] : Summary the patient is a 65-year-old morbidly obese female with past medical history significant for obstructive sleep apnea, COPD, hypertension, GERD status post COVID-19 pneumonia who presents today for follow-up.  The patient's physical exam is significant for proved air entry bilaterally.  I had a lengthy discussion with the patient regarding smoking cessation.  Patient is instructed to continue current medications and to follow-up in 3 months.  Prescription renewal performed

## 2021-03-23 NOTE — HISTORY OF PRESENT ILLNESS
[Current] : current [Never] : never [TextBox_4] : Patient is a 65-year-old morbidly obese female with past medical history significant for hypertension, high cholesterol, obstructive sleep apnea, depression who was recently discharged from the hospital for Covid.  19 pneumonia.  The patient presents today for further management she states that her shortness of breath dyspnea on exertion have improved.  She does complain that she is unable to lose weight.  She denies fevers chills chest pain weight loss or hemoptysis

## 2021-05-13 ENCOUNTER — APPOINTMENT (OUTPATIENT)
Dept: INTERNAL MEDICINE | Facility: CLINIC | Age: 66
End: 2021-05-13
Payer: MEDICARE

## 2021-05-13 VITALS
OXYGEN SATURATION: 92 % | TEMPERATURE: 98.7 F | SYSTOLIC BLOOD PRESSURE: 145 MMHG | HEART RATE: 95 BPM | BODY MASS INDEX: 41.95 KG/M2 | WEIGHT: 293 LBS | HEIGHT: 70 IN | DIASTOLIC BLOOD PRESSURE: 84 MMHG | RESPIRATION RATE: 16 BRPM

## 2021-05-13 DIAGNOSIS — M19.90 UNSPECIFIED OSTEOARTHRITIS, UNSPECIFIED SITE: ICD-10-CM

## 2021-05-13 DIAGNOSIS — J44.9 CHRONIC OBSTRUCTIVE PULMONARY DISEASE, UNSPECIFIED: ICD-10-CM

## 2021-05-13 DIAGNOSIS — U07.1 COVID-19: ICD-10-CM

## 2021-05-13 DIAGNOSIS — T78.40XA ALLERGY, UNSPECIFIED, INITIAL ENCOUNTER: ICD-10-CM

## 2021-05-13 PROCEDURE — 36415 COLL VENOUS BLD VENIPUNCTURE: CPT

## 2021-05-13 PROCEDURE — 99214 OFFICE O/P EST MOD 30 MIN: CPT | Mod: CS,25

## 2021-05-13 RX ORDER — FLUTICASONE PROPIONATE 50 UG/1
50 SPRAY, METERED NASAL DAILY
Qty: 1 | Refills: 0 | Status: ACTIVE | COMMUNITY
Start: 2021-05-13 | End: 1900-01-01

## 2021-05-14 LAB
25(OH)D3 SERPL-MCNC: 26.2 NG/ML
ALBUMIN SERPL ELPH-MCNC: 4.1 G/DL
ALP BLD-CCNC: 60 U/L
ALT SERPL-CCNC: 14 U/L
ANION GAP SERPL CALC-SCNC: 13 MMOL/L
AST SERPL-CCNC: 13 U/L
BASOPHILS # BLD AUTO: 0.05 K/UL
BASOPHILS NFR BLD AUTO: 0.4 %
BILIRUB SERPL-MCNC: 0.2 MG/DL
BUN SERPL-MCNC: 19 MG/DL
CALCIUM SERPL-MCNC: 10.2 MG/DL
CHLORIDE SERPL-SCNC: 104 MMOL/L
CHOLEST SERPL-MCNC: 228 MG/DL
CO2 SERPL-SCNC: 23 MMOL/L
CREAT SERPL-MCNC: 0.82 MG/DL
EOSINOPHIL # BLD AUTO: 0.2 K/UL
EOSINOPHIL NFR BLD AUTO: 1.7 %
GLUCOSE SERPL-MCNC: 97 MG/DL
HCT VFR BLD CALC: 48.8 %
HDLC SERPL-MCNC: 35 MG/DL
HGB BLD-MCNC: 16 G/DL
IMM GRANULOCYTES NFR BLD AUTO: 0.4 %
LDLC SERPL CALC-MCNC: NORMAL MG/DL
LYMPHOCYTES # BLD AUTO: 3.87 K/UL
LYMPHOCYTES NFR BLD AUTO: 32.5 %
MAN DIFF?: NORMAL
MCHC RBC-ENTMCNC: 30.4 PG
MCHC RBC-ENTMCNC: 32.8 GM/DL
MCV RBC AUTO: 92.8 FL
MONOCYTES # BLD AUTO: 0.87 K/UL
MONOCYTES NFR BLD AUTO: 7.3 %
NEUTROPHILS # BLD AUTO: 6.88 K/UL
NEUTROPHILS NFR BLD AUTO: 57.7 %
NONHDLC SERPL-MCNC: 193 MG/DL
PLATELET # BLD AUTO: 260 K/UL
POTASSIUM SERPL-SCNC: 4.7 MMOL/L
PROT SERPL-MCNC: 7.1 G/DL
RBC # BLD: 5.26 M/UL
RBC # FLD: 13.1 %
SODIUM SERPL-SCNC: 140 MMOL/L
T3 SERPL-MCNC: 123 NG/DL
T4 FREE SERPL-MCNC: 1.1 NG/DL
T4 SERPL-MCNC: 7.7 UG/DL
TRIGL SERPL-MCNC: 410 MG/DL
TSH SERPL-ACNC: 0.39 UIU/ML
WBC # FLD AUTO: 11.92 K/UL

## 2021-05-15 NOTE — HISTORY OF PRESENT ILLNESS
[FreeTextEntry8] : EVAN MONTOYA is a 65 year old female with a PMHx of HTN, HLD, OAD, OA, Pneumonia due to COVID -19 virus (infection), depression, and obesity who presents today complaining of nasal congestion and cough since last week.\par \par Pt states that last week she had rhinorrhea that subsided. \par \par Pt denies fever.\par \par Pt still smokes 1 ppd.

## 2021-05-15 NOTE — PLAN
[FreeTextEntry1] : Flonase 2 puffs to each nostril QD\par \par Continue same medications\par \par Blood tests and urine sent to the lab

## 2021-05-15 NOTE — HEALTH RISK ASSESSMENT
[] : Yes [No] : In the past 12 months have you used drugs other than those required for medical reasons? No [No falls in past year] : Patient reported no falls in the past year [0] : 2) Feeling down, depressed, or hopeless: Not at all (0) [de-identified] : 1 ppd [Audit-CScore] : 0 [de-identified] : sedentary [de-identified] : regular [[id number]] : 0

## 2021-05-15 NOTE — PHYSICAL EXAM
[No Acute Distress] : no acute distress [Well Nourished] : well nourished [Well Developed] : well developed [Well-Appearing] : well-appearing [Normal Sclera/Conjunctiva] : normal sclera/conjunctiva [PERRL] : pupils equal round and reactive to light [EOMI] : extraocular movements intact [Normal Oropharynx] : the oropharynx was normal [No JVD] : no jugular venous distention [No Lymphadenopathy] : no lymphadenopathy [Supple] : supple [Thyroid Normal, No Nodules] : the thyroid was normal and there were no nodules present [No Respiratory Distress] : no respiratory distress  [No Accessory Muscle Use] : no accessory muscle use [Clear to Auscultation] : lungs were clear to auscultation bilaterally [Normal Rate] : normal rate  [Regular Rhythm] : with a regular rhythm [Normal S1, S2] : normal S1 and S2 [No Murmur] : no murmur heard [No Carotid Bruits] : no carotid bruits [No Abdominal Bruit] : a ~M bruit was not heard ~T in the abdomen [No Varicosities] : no varicosities [Pedal Pulses Present] : the pedal pulses are present [No Edema] : there was no peripheral edema [No Palpable Aorta] : no palpable aorta [No Extremity Clubbing/Cyanosis] : no extremity clubbing/cyanosis [Soft] : abdomen soft [Non Tender] : non-tender [Non-distended] : non-distended [No Masses] : no abdominal mass palpated [No HSM] : no HSM [Normal Bowel Sounds] : normal bowel sounds [Normal Posterior Cervical Nodes] : no posterior cervical lymphadenopathy [Normal Anterior Cervical Nodes] : no anterior cervical lymphadenopathy [No CVA Tenderness] : no CVA  tenderness [No Spinal Tenderness] : no spinal tenderness [No Joint Swelling] : no joint swelling [Grossly Normal Strength/Tone] : grossly normal strength/tone [No Rash] : no rash [Coordination Grossly Intact] : coordination grossly intact [No Focal Deficits] : no focal deficits [Normal Gait] : normal gait [Deep Tendon Reflexes (DTR)] : deep tendon reflexes were 2+ and symmetric [Normal Affect] : the affect was normal [Normal Insight/Judgement] : insight and judgment were intact [de-identified] : Obese [de-identified] : congested nostrils [de-identified] : Deferred [FreeTextEntry1] : Deferred

## 2021-06-07 ENCOUNTER — RX CHANGE (OUTPATIENT)
Age: 66
End: 2021-06-07

## 2021-06-14 RX ORDER — CIPROFLOXACIN HYDROCHLORIDE 500 MG/1
500 TABLET, FILM COATED ORAL TWICE DAILY
Qty: 14 | Refills: 0 | Status: COMPLETED | COMMUNITY
Start: 2020-05-29 | End: 2021-06-14

## 2021-06-14 RX ORDER — MULTIVIT-MIN/FOLIC/VIT K/LYCOP 400-300MCG
25 MCG TABLET ORAL DAILY
Qty: 90 | Refills: 3 | Status: COMPLETED | COMMUNITY
Start: 2019-06-20 | End: 2021-06-14

## 2021-06-14 RX ORDER — CHOLECALCIFEROL (VITAMIN D3) 50 MCG
25 MCG TABLET ORAL
Qty: 90 | Refills: 1 | Status: ACTIVE | COMMUNITY
Start: 2020-06-11 | End: 1900-01-01

## 2021-06-14 RX ORDER — BUTALBITAL, ACETAMINOPHEN AND CAFFEINE 325; 50; 40 MG/1; MG/1; MG/1
50-325-40 TABLET ORAL
Qty: 30 | Refills: 3 | Status: COMPLETED | COMMUNITY
Start: 2019-10-29 | End: 2021-06-14

## 2021-06-14 RX ORDER — IBUPROFEN 600 MG/1
600 TABLET, FILM COATED ORAL 3 TIMES DAILY
Qty: 60 | Refills: 1 | Status: COMPLETED | COMMUNITY
Start: 2019-06-07 | End: 2021-06-14

## 2021-06-21 ENCOUNTER — APPOINTMENT (OUTPATIENT)
Dept: INTERNAL MEDICINE | Facility: CLINIC | Age: 66
End: 2021-06-21

## 2021-09-07 ENCOUNTER — APPOINTMENT (OUTPATIENT)
Dept: PULMONOLOGY | Facility: CLINIC | Age: 66
End: 2021-09-07
Payer: MEDICARE

## 2021-09-07 VITALS
RESPIRATION RATE: 14 BRPM | BODY MASS INDEX: 41.95 KG/M2 | OXYGEN SATURATION: 91 % | SYSTOLIC BLOOD PRESSURE: 143 MMHG | DIASTOLIC BLOOD PRESSURE: 82 MMHG | HEIGHT: 70 IN | HEART RATE: 97 BPM | TEMPERATURE: 98.1 F | WEIGHT: 293 LBS

## 2021-09-07 DIAGNOSIS — J44.1 CHRONIC OBSTRUCTIVE PULMONARY DISEASE WITH (ACUTE) EXACERBATION: ICD-10-CM

## 2021-09-07 DIAGNOSIS — I10 ESSENTIAL (PRIMARY) HYPERTENSION: ICD-10-CM

## 2021-09-07 DIAGNOSIS — F32.9 MAJOR DEPRESSIVE DISORDER, SINGLE EPISODE, UNSPECIFIED: ICD-10-CM

## 2021-09-07 PROCEDURE — 99214 OFFICE O/P EST MOD 30 MIN: CPT | Mod: CS,25

## 2021-09-07 PROCEDURE — 96372 THER/PROPH/DIAG INJ SC/IM: CPT

## 2021-09-07 NOTE — HISTORY OF PRESENT ILLNESS
[Current] : current [Never] : never [TextBox_4] : Patient is a 66-year-old morbidly obese female with past medical history significant for COPD status post COVID-19 pneumonia, depression, hypertension, obstructive sleep apnea currently on CPAP presents in moderate respiratory distress.  Patient presents complaining of worsening cough shortness of breath dyspnea on exertion with audible wheezing.  She states that she is unable to walk multiple blocks without becoming short of breath.  Patient smokes approximately 1 pack/day and has been noncompliant with her CPAP.  She currently denies fevers chills chest pain weight loss or hemoptysis [TextBox_11] : 1

## 2021-09-07 NOTE — ASSESSMENT
[FreeTextEntry1] : In summary the patient is a 66-year-old obese female with past medical history significant for hypertension, high cholesterol, COPD, obstructive sleep apnea who presents today for an acute visit.  The patient was found to be in extremis during accessory muscles and sitting in the tripod position.  The patient was started on 2 L nasal cannula and given bronchodilator therapy via nebulization.  Solu-Medrol 125 mg IV push given.  The patient is instructed to continue current medications and follow-up in 2 weeks

## 2021-10-06 PROBLEM — U07.1 PNEUMONIA DUE TO COVID-19 VIRUS: Status: ACTIVE | Noted: 2021-01-07

## 2021-10-15 DIAGNOSIS — R91.8 OTHER NONSPECIFIC ABNORMAL FINDING OF LUNG FIELD: ICD-10-CM

## 2021-10-15 DIAGNOSIS — J98.59 OTHER DISEASES OF MEDIASTINUM, NOT ELSEWHERE CLASSIFIED: ICD-10-CM

## 2021-10-21 ENCOUNTER — APPOINTMENT (OUTPATIENT)
Dept: PULMONOLOGY | Facility: CLINIC | Age: 66
End: 2021-10-21
Payer: MEDICARE

## 2021-10-21 VITALS
HEIGHT: 70 IN | HEART RATE: 98 BPM | WEIGHT: 293 LBS | DIASTOLIC BLOOD PRESSURE: 87 MMHG | RESPIRATION RATE: 16 BRPM | OXYGEN SATURATION: 91 % | BODY MASS INDEX: 41.95 KG/M2 | SYSTOLIC BLOOD PRESSURE: 140 MMHG

## 2021-10-21 DIAGNOSIS — Z99.89 OBSTRUCTIVE SLEEP APNEA (ADULT) (PEDIATRIC): ICD-10-CM

## 2021-10-21 DIAGNOSIS — E78.5 HYPERLIPIDEMIA, UNSPECIFIED: ICD-10-CM

## 2021-10-21 DIAGNOSIS — Z87.01 PERSONAL HISTORY OF PNEUMONIA (RECURRENT): ICD-10-CM

## 2021-10-21 DIAGNOSIS — G47.33 OBSTRUCTIVE SLEEP APNEA (ADULT) (PEDIATRIC): ICD-10-CM

## 2021-10-21 PROCEDURE — 99214 OFFICE O/P EST MOD 30 MIN: CPT

## 2021-10-21 NOTE — ASSESSMENT
[FreeTextEntry1] : In summary the patient is a 66-year-old obese female with past medical history significant for hypertension, high cholesterol, COPD, obstructive sleep apnea recently diagnosed with small cell carcinoma who presents for follow-up.  Her physical exam is significant for decreased breath sounds bilaterally with expiratory wheezing.  The patient is now being referred to oncology for further management.  The patient will require a CT of the head and PET scan.  We will also include Dr. Alcala in the management of this patient and the patient is instructed to follow-up in 2 weeks

## 2021-10-21 NOTE — REASON FOR VISIT
[Follow-Up - From Hospitalization] : a follow-up visit after a recent hospitalization [Lung Cancer] : lung cancer [Sleep Apnea] : sleep apnea [COPD] : COPD [Shortness of Breath] : shortness of breath [Wheezing] : wheezing

## 2021-10-21 NOTE — HISTORY OF PRESENT ILLNESS
[Former] : former [Never] : never [TextBox_4] : Patient is a 66-year-old morbidly obese female with past medical history significant for COPD, active 1 to 2 pack/day smoker, history of COVID-19 pneumonia, depression, hypertension, obstructive sleep apnea who recently underwent a bronchoscopy and biopsy of mediastinal mass and was found to have small cell carcinoma.  The patient complains of anxiety shortness of breath and dyspnea on exertion.  States that she is willing to undergo treatment for her carcinoma

## 2021-10-26 ENCOUNTER — APPOINTMENT (OUTPATIENT)
Dept: PULMONOLOGY | Facility: CLINIC | Age: 66
End: 2021-10-26
Payer: MEDICARE

## 2021-10-26 VITALS
WEIGHT: 293 LBS | OXYGEN SATURATION: 88 % | DIASTOLIC BLOOD PRESSURE: 86 MMHG | BODY MASS INDEX: 41.95 KG/M2 | RESPIRATION RATE: 15 BRPM | HEIGHT: 70 IN | HEART RATE: 114 BPM | SYSTOLIC BLOOD PRESSURE: 134 MMHG | TEMPERATURE: 98.2 F

## 2021-10-26 DIAGNOSIS — U07.1 COVID-19: ICD-10-CM

## 2021-10-26 DIAGNOSIS — R51.9 HEADACHE, UNSPECIFIED: ICD-10-CM

## 2021-10-26 DIAGNOSIS — J44.9 CHRONIC OBSTRUCTIVE PULMONARY DISEASE, UNSPECIFIED: ICD-10-CM

## 2021-10-26 DIAGNOSIS — I10 ESSENTIAL (PRIMARY) HYPERTENSION: ICD-10-CM

## 2021-10-26 DIAGNOSIS — C34.91 MALIGNANT NEOPLASM OF UNSPECIFIED PART OF RIGHT BRONCHUS OR LUNG: ICD-10-CM

## 2021-10-26 DIAGNOSIS — Z87.891 PERSONAL HISTORY OF NICOTINE DEPENDENCE: ICD-10-CM

## 2021-10-26 DIAGNOSIS — F17.200 NICOTINE DEPENDENCE, UNSPECIFIED, UNCOMPLICATED: ICD-10-CM

## 2021-10-26 DIAGNOSIS — E66.9 OBESITY, UNSPECIFIED: ICD-10-CM

## 2021-10-26 PROCEDURE — 99214 OFFICE O/P EST MOD 30 MIN: CPT | Mod: CS

## 2021-10-26 RX ORDER — PROMETHAZINE HYDROCHLORIDE 6.25 MG/5ML
6.25 SOLUTION ORAL
Qty: 470 | Refills: 0 | Status: ACTIVE | COMMUNITY
Start: 2021-10-26 | End: 1900-01-01

## 2021-10-26 NOTE — REVIEW OF SYSTEMS
[Fever] : no fever [Fatigue] : fatigue [Chills] : no chills [Poor Appetite] : poor appetite [Postnasal Drip] : postnasal drip [Cough] : cough [Hemoptysis] : no hemoptysis [Sputum] : sputum [Dyspnea] : dyspnea [Pleuritic Pain] : pleuritic pain [Wheezing] : wheezing [A.M. Dry Mouth] : a.m. dry mouth [SOB on Exertion] : sob on exertion [Chest Discomfort] : chest discomfort [Headache] : headache [Dizziness] : no dizziness [Negative] : Endocrine

## 2021-10-26 NOTE — REASON FOR VISIT
[Follow-Up] : a follow-up visit [Lung Cancer] : lung cancer [COPD] : COPD [Shortness of Breath] : shortness of breath [Family Member] : family member

## 2021-10-26 NOTE — HISTORY OF PRESENT ILLNESS
[Never] : never [Current] : current [TextBox_4] : Patient is a 66-year-old morbidly obese female with past medical history significant for COPD, pretension, high cholesterol, small cell lung cancer who presents for follow-up.  The patient is currently on portable and home oxygen.  She is complaining of back pain with associated headaches.  She currently is extremely angry at her diagnosis and lashing out at her family

## 2021-10-26 NOTE — ASSESSMENT
[FreeTextEntry1] : In summary the patient is a 66-year-old obese female with past medical history significant for tobacco abuse recently diagnosed with small cell carcinoma who presents for follow-up.  Patient's physical exam is significant for expiratory wheezing.  I reviewed the patient's previous studies at [facility].  I had an extensive discussion with the patient and her daughter.  I have arranged for the patient to undergo a PET scan on Friday and she will be subsequently referred to oncology.  The patient is instructed to follow-up in 2 weeks

## 2021-11-01 RX ORDER — OXYCODONE AND ACETAMINOPHEN 10; 325 MG/1; MG/1
10-325 TABLET ORAL
Qty: 120 | Refills: 0 | Status: ACTIVE | COMMUNITY
Start: 2021-10-26 | End: 1900-01-01

## 2021-11-05 RX ORDER — NICOTINE 21 MG/24HR
21 PATCH, TRANSDERMAL 24 HOURS TRANSDERMAL DAILY
Qty: 90 | Refills: 0 | Status: ACTIVE | COMMUNITY
Start: 2021-11-05 | End: 1900-01-01

## 2021-11-05 RX ORDER — ALBUTEROL SULFATE 90 UG/1
108 (90 BASE) INHALANT RESPIRATORY (INHALATION)
Qty: 3 | Refills: 3 | Status: ACTIVE | COMMUNITY
Start: 2021-11-05 | End: 1900-01-01

## 2021-11-08 ENCOUNTER — OUTPATIENT (OUTPATIENT)
Dept: OUTPATIENT SERVICES | Facility: HOSPITAL | Age: 66
LOS: 1 days | End: 2021-11-08
Payer: MEDICARE

## 2021-11-08 ENCOUNTER — APPOINTMENT (OUTPATIENT)
Dept: INTERVENTIONAL RADIOLOGY/VASCULAR | Facility: HOSPITAL | Age: 66
End: 2021-11-08
Payer: MEDICARE

## 2021-11-08 PROCEDURE — 76937 US GUIDE VASCULAR ACCESS: CPT | Mod: 26

## 2021-11-08 PROCEDURE — C1788: CPT

## 2021-11-08 PROCEDURE — 36561 INSERT TUNNELED CV CATH: CPT

## 2021-11-08 PROCEDURE — C1769: CPT

## 2021-11-08 PROCEDURE — 77001 FLUOROGUIDE FOR VEIN DEVICE: CPT | Mod: 26

## 2021-11-08 PROCEDURE — 99153 MOD SED SAME PHYS/QHP EA: CPT

## 2021-11-08 PROCEDURE — 99152 MOD SED SAME PHYS/QHP 5/>YRS: CPT

## 2021-11-08 PROCEDURE — 76937 US GUIDE VASCULAR ACCESS: CPT

## 2021-11-08 PROCEDURE — 77001 FLUOROGUIDE FOR VEIN DEVICE: CPT

## 2021-11-15 ENCOUNTER — APPOINTMENT (OUTPATIENT)
Dept: INTERVENTIONAL RADIOLOGY/VASCULAR | Facility: HOSPITAL | Age: 66
End: 2021-11-15

## 2021-12-15 ENCOUNTER — RX RENEWAL (OUTPATIENT)
Age: 66
End: 2021-12-15

## 2021-12-15 RX ORDER — ATORVASTATIN CALCIUM 20 MG/1
20 TABLET, FILM COATED ORAL
Qty: 90 | Refills: 1 | Status: ACTIVE | COMMUNITY
Start: 2019-06-20 | End: 1900-01-01

## 2021-12-15 RX ORDER — AMLODIPINE BESYLATE 5 MG/1
5 TABLET ORAL DAILY
Qty: 90 | Refills: 1 | Status: ACTIVE | COMMUNITY
Start: 2019-06-20 | End: 1900-01-01

## 2022-01-07 ENCOUNTER — RX RENEWAL (OUTPATIENT)
Age: 67
End: 2022-01-07

## 2022-01-07 RX ORDER — ARIPIPRAZOLE 2 MG/1
2 TABLET ORAL DAILY
Qty: 90 | Refills: 1 | Status: ACTIVE | COMMUNITY
Start: 2019-06-19 | End: 1900-01-01

## 2022-01-07 RX ORDER — BUPROPION HYDROCHLORIDE 200 MG/1
200 TABLET, FILM COATED, EXTENDED RELEASE ORAL DAILY
Qty: 90 | Refills: 1 | Status: ACTIVE | COMMUNITY
Start: 2022-01-07 | End: 1900-01-01

## 2022-01-19 ENCOUNTER — OUTPATIENT (OUTPATIENT)
Dept: OUTPATIENT SERVICES | Facility: HOSPITAL | Age: 67
LOS: 1 days | End: 2022-01-19
Payer: MEDICARE

## 2022-01-19 PROCEDURE — 71046 X-RAY EXAM CHEST 2 VIEWS: CPT

## 2022-01-19 PROCEDURE — 71046 X-RAY EXAM CHEST 2 VIEWS: CPT | Mod: 26

## 2022-01-31 ENCOUNTER — EMERGENCY (EMERGENCY)
Facility: HOSPITAL | Age: 67
LOS: 1 days | Discharge: HOME | End: 2022-01-31
Attending: EMERGENCY MEDICINE | Admitting: EMERGENCY MEDICINE
Payer: MEDICARE

## 2022-01-31 ENCOUNTER — APPOINTMENT (OUTPATIENT)
Dept: INTERVENTIONAL RADIOLOGY/VASCULAR | Facility: HOSPITAL | Age: 67
End: 2022-01-31

## 2022-01-31 VITALS
OXYGEN SATURATION: 95 % | WEIGHT: 287.92 LBS | SYSTOLIC BLOOD PRESSURE: 135 MMHG | HEIGHT: 70 IN | HEART RATE: 111 BPM | TEMPERATURE: 99 F | RESPIRATION RATE: 22 BRPM | DIASTOLIC BLOOD PRESSURE: 84 MMHG

## 2022-01-31 VITALS
DIASTOLIC BLOOD PRESSURE: 85 MMHG | OXYGEN SATURATION: 95 % | HEART RATE: 101 BPM | SYSTOLIC BLOOD PRESSURE: 128 MMHG | TEMPERATURE: 98 F | RESPIRATION RATE: 20 BRPM

## 2022-01-31 DIAGNOSIS — I10 ESSENTIAL (PRIMARY) HYPERTENSION: ICD-10-CM

## 2022-01-31 DIAGNOSIS — R07.9 CHEST PAIN, UNSPECIFIED: ICD-10-CM

## 2022-01-31 DIAGNOSIS — Z20.822 CONTACT WITH AND (SUSPECTED) EXPOSURE TO COVID-19: ICD-10-CM

## 2022-01-31 DIAGNOSIS — R06.00 DYSPNEA, UNSPECIFIED: ICD-10-CM

## 2022-01-31 DIAGNOSIS — E78.5 HYPERLIPIDEMIA, UNSPECIFIED: ICD-10-CM

## 2022-01-31 DIAGNOSIS — R05.9 COUGH, UNSPECIFIED: ICD-10-CM

## 2022-01-31 DIAGNOSIS — N39.0 URINARY TRACT INFECTION, SITE NOT SPECIFIED: ICD-10-CM

## 2022-01-31 DIAGNOSIS — R53.1 WEAKNESS: ICD-10-CM

## 2022-01-31 DIAGNOSIS — F17.200 NICOTINE DEPENDENCE, UNSPECIFIED, UNCOMPLICATED: ICD-10-CM

## 2022-01-31 LAB
ALBUMIN SERPL ELPH-MCNC: 3.9 G/DL — SIGNIFICANT CHANGE UP (ref 3.3–5)
ALP SERPL-CCNC: 51 U/L — SIGNIFICANT CHANGE UP (ref 40–120)
ALT FLD-CCNC: 18 U/L — SIGNIFICANT CHANGE UP (ref 10–45)
ANION GAP SERPL CALC-SCNC: 10 MMOL/L — SIGNIFICANT CHANGE UP (ref 5–17)
APPEARANCE UR: CLEAR — SIGNIFICANT CHANGE UP
APTT BLD: 31.7 SEC — SIGNIFICANT CHANGE UP (ref 27.5–35.5)
AST SERPL-CCNC: 23 U/L — SIGNIFICANT CHANGE UP (ref 10–40)
BACTERIA # UR AUTO: ABNORMAL /HPF
BILIRUB SERPL-MCNC: 0.4 MG/DL — SIGNIFICANT CHANGE UP (ref 0.2–1.2)
BILIRUB UR-MCNC: NEGATIVE — SIGNIFICANT CHANGE UP
BLD GP AB SCN SERPL QL: NEGATIVE — SIGNIFICANT CHANGE UP
BUN SERPL-MCNC: 12 MG/DL — SIGNIFICANT CHANGE UP (ref 7–23)
CALCIUM SERPL-MCNC: 9.5 MG/DL — SIGNIFICANT CHANGE UP (ref 8.4–10.5)
CHLORIDE SERPL-SCNC: 103 MMOL/L — SIGNIFICANT CHANGE UP (ref 96–108)
CO2 SERPL-SCNC: 25 MMOL/L — SIGNIFICANT CHANGE UP (ref 22–31)
COLOR SPEC: YELLOW — SIGNIFICANT CHANGE UP
CREAT SERPL-MCNC: 0.6 MG/DL — SIGNIFICANT CHANGE UP (ref 0.5–1.3)
DIFF PNL FLD: ABNORMAL
EPI CELLS # UR: SIGNIFICANT CHANGE UP /HPF (ref 0–5)
FLUAV AG NPH QL: SIGNIFICANT CHANGE UP
FLUBV AG NPH QL: SIGNIFICANT CHANGE UP
GLUCOSE SERPL-MCNC: 129 MG/DL — HIGH (ref 70–99)
GLUCOSE UR QL: NEGATIVE — SIGNIFICANT CHANGE UP
HCT VFR BLD CALC: 41.2 % — SIGNIFICANT CHANGE UP (ref 34.5–45)
HGB BLD-MCNC: 13.7 G/DL — SIGNIFICANT CHANGE UP (ref 11.5–15.5)
INR BLD: 1.14 — SIGNIFICANT CHANGE UP (ref 0.88–1.16)
KETONES UR-MCNC: NEGATIVE — SIGNIFICANT CHANGE UP
LEUKOCYTE ESTERASE UR-ACNC: NEGATIVE — SIGNIFICANT CHANGE UP
MCHC RBC-ENTMCNC: 30.9 PG — SIGNIFICANT CHANGE UP (ref 27–34)
MCHC RBC-ENTMCNC: 33.3 GM/DL — SIGNIFICANT CHANGE UP (ref 32–36)
MCV RBC AUTO: 93 FL — SIGNIFICANT CHANGE UP (ref 80–100)
NITRITE UR-MCNC: POSITIVE
NRBC # BLD: 0 /100 WBCS — SIGNIFICANT CHANGE UP (ref 0–0)
NT-PROBNP SERPL-SCNC: 54 PG/ML — SIGNIFICANT CHANGE UP (ref 0–300)
PH UR: 6.5 — SIGNIFICANT CHANGE UP (ref 5–8)
PLATELET # BLD AUTO: 275 K/UL — SIGNIFICANT CHANGE UP (ref 150–400)
POTASSIUM SERPL-MCNC: 4.2 MMOL/L — SIGNIFICANT CHANGE UP (ref 3.5–5.3)
POTASSIUM SERPL-SCNC: 4.2 MMOL/L — SIGNIFICANT CHANGE UP (ref 3.5–5.3)
PROT SERPL-MCNC: 7.1 G/DL — SIGNIFICANT CHANGE UP (ref 6–8.3)
PROT UR-MCNC: 30 MG/DL
PROTHROM AB SERPL-ACNC: 13.6 SEC — SIGNIFICANT CHANGE UP (ref 10.6–13.6)
RBC # BLD: 4.43 M/UL — SIGNIFICANT CHANGE UP (ref 3.8–5.2)
RBC # FLD: 16.6 % — HIGH (ref 10.3–14.5)
RBC CASTS # UR COMP ASSIST: < 5 /HPF — SIGNIFICANT CHANGE UP
RH IG SCN BLD-IMP: POSITIVE — SIGNIFICANT CHANGE UP
RSV RNA NPH QL NAA+NON-PROBE: SIGNIFICANT CHANGE UP
SARS-COV-2 RNA SPEC QL NAA+PROBE: SIGNIFICANT CHANGE UP
SODIUM SERPL-SCNC: 138 MMOL/L — SIGNIFICANT CHANGE UP (ref 135–145)
SP GR SPEC: 1.01 — SIGNIFICANT CHANGE UP (ref 1–1.03)
TROPONIN T SERPL-MCNC: 0.01 NG/ML — SIGNIFICANT CHANGE UP (ref 0–0.01)
TROPONIN T SERPL-MCNC: <0.01 NG/ML — SIGNIFICANT CHANGE UP (ref 0–0.01)
UROBILINOGEN FLD QL: 0.2 E.U./DL — SIGNIFICANT CHANGE UP
WBC # BLD: 14.82 K/UL — HIGH (ref 3.8–10.5)
WBC # FLD AUTO: 14.82 K/UL — HIGH (ref 3.8–10.5)
WBC UR QL: > 10 /HPF

## 2022-01-31 PROCEDURE — 99284 EMERGENCY DEPT VISIT MOD MDM: CPT | Mod: 25

## 2022-01-31 PROCEDURE — 81001 URINALYSIS AUTO W/SCOPE: CPT

## 2022-01-31 PROCEDURE — 87086 URINE CULTURE/COLONY COUNT: CPT

## 2022-01-31 PROCEDURE — 86901 BLOOD TYPING SEROLOGIC RH(D): CPT

## 2022-01-31 PROCEDURE — 93010 ELECTROCARDIOGRAM REPORT: CPT

## 2022-01-31 PROCEDURE — 36415 COLL VENOUS BLD VENIPUNCTURE: CPT

## 2022-01-31 PROCEDURE — 87637 SARSCOV2&INF A&B&RSV AMP PRB: CPT

## 2022-01-31 PROCEDURE — 99285 EMERGENCY DEPT VISIT HI MDM: CPT

## 2022-01-31 PROCEDURE — 80053 COMPREHEN METABOLIC PANEL: CPT

## 2022-01-31 PROCEDURE — 84484 ASSAY OF TROPONIN QUANT: CPT

## 2022-01-31 PROCEDURE — 86850 RBC ANTIBODY SCREEN: CPT

## 2022-01-31 PROCEDURE — 83880 ASSAY OF NATRIURETIC PEPTIDE: CPT

## 2022-01-31 PROCEDURE — 71275 CT ANGIOGRAPHY CHEST: CPT | Mod: ME

## 2022-01-31 PROCEDURE — G1004: CPT

## 2022-01-31 PROCEDURE — 85730 THROMBOPLASTIN TIME PARTIAL: CPT

## 2022-01-31 PROCEDURE — 71045 X-RAY EXAM CHEST 1 VIEW: CPT

## 2022-01-31 PROCEDURE — 85027 COMPLETE CBC AUTOMATED: CPT

## 2022-01-31 PROCEDURE — 87186 SC STD MICRODIL/AGAR DIL: CPT

## 2022-01-31 PROCEDURE — 71045 X-RAY EXAM CHEST 1 VIEW: CPT | Mod: 26

## 2022-01-31 PROCEDURE — 71275 CT ANGIOGRAPHY CHEST: CPT | Mod: 26,ME

## 2022-01-31 PROCEDURE — 85610 PROTHROMBIN TIME: CPT

## 2022-01-31 PROCEDURE — 86900 BLOOD TYPING SEROLOGIC ABO: CPT

## 2022-01-31 RX ORDER — CEFPODOXIME PROXETIL 100 MG
100 TABLET ORAL ONCE
Refills: 0 | Status: COMPLETED | OUTPATIENT
Start: 2022-01-31 | End: 2022-01-31

## 2022-01-31 RX ORDER — CEFPODOXIME PROXETIL 100 MG
1 TABLET ORAL
Qty: 14 | Refills: 0
Start: 2022-01-31 | End: 2022-02-06

## 2022-01-31 RX ADMIN — Medication 100 MILLIGRAM(S): at 17:46

## 2022-01-31 NOTE — ED ADULT NURSE NOTE - OBJECTIVE STATEMENT
pt c/o weakness, generalized chest pain and back pain, c/o sob, more trouble breathing than usual  pt put on 3LNC, on O2 at home = hx of lung CA  pt currently on fentanyl patch for pain prescribed by her doctor  denies fever,chills,n/v

## 2022-01-31 NOTE — ED PROVIDER NOTE - NSFOLLOWUPINSTRUCTIONS_ED_ALL_ED_FT
Please take medication as directed and follow-up up with Dr. Castillo.    Urinary Tract Infection in Women    WHAT YOU NEED TO KNOW:    A urinary tract infection (UTI) is caused by bacteria that get inside your urinary tract. Most bacteria that enter your urinary tract come out when you urinate. If the bacteria stay in your urinary tract, you may get an infection. Your urinary tract includes your kidneys, ureters, bladder, and urethra. Urine is made in your kidneys, and it flows from the ureters to the bladder. Urine leaves the bladder through the urethra. A UTI is more common in your lower urinary tract, which includes your bladder and urethra.     Female Urinary System         DISCHARGE INSTRUCTIONS:    Return to the emergency department if:   •You are urinating very little or not at all.      •You have a high fever with shaking chills.       •You have side or back pain that gets worse.      Call your doctor if:   •You have a fever.      •You do not feel better after 2 days of taking antibiotics.      •You are vomiting.       •You have questions or concerns about your condition or care.      Medicines:   •Antibiotics help fight a bacterial infection. If you have UTIs often (called recurrent UTIs), you may be given antibiotics to take regularly. You will be given directions for when and how to use antibiotics. The goal is to prevent UTIs but not cause antibiotic resistance by using antibiotics too often.      •Medicines may be given to decrease pain and burning when you urinate. They will also help decrease the feeling that you need to urinate often. These medicines will make your urine orange or red.      •Take your medicine as directed. Contact your healthcare provider if you think your medicine is not helping or if you have side effects. Tell him or her if you are allergic to any medicine. Keep a list of the medicines, vitamins, and herbs you take. Include the amounts, and when and why you take them. Bring the list or the pill bottles to follow-up visits. Carry your medicine list with you in case of an emergency.      Follow up with your doctor as directed: Write down your questions so you remember to ask them during your visits.     Prevent another UTI:   •Empty your bladder often. Urinate and empty your bladder as soon as you feel the need. Do not hold your urine for long periods of time.      •Wipe from front to back after you urinate or have a bowel movement. This will help prevent germs from getting into your urinary tract through your urethra.      •Drink liquids as directed. Ask how much liquid to drink each day and which liquids are best for you. You may need to drink more liquids than usual to help flush out the bacteria. Do not drink alcohol, caffeine, or citrus juices. These can irritate your bladder and increase your symptoms. Your healthcare provider may recommend cranberry juice to help prevent a UTI.      •Urinate after you have sex. This can help flush out bacteria passed during sex.      •Do not douche or use feminine deodorants. These can change the chemical balance in your vagina.      •Change sanitary pads or tampons often. This will help prevent germs from getting into your urinary tract.       •Talk to your healthcare provider about your birth control method. You may need to change your method if it is increasing your risk for UTIs.      •Wear cotton underwear and clothes that are loose. Tight pants and nylon underwear can trap moisture and cause bacteria to grow.      •Vaginal estrogen may be recommended. This medicine helps prevent UTIs in women who have gone through menopause or are in ganga-menopause.      •Do pelvic muscle exercises often. Pelvic muscle exercises may help you start and stop urinating. Strong pelvic muscles may help you empty your bladder easier. Squeeze these muscles tightly for 5 seconds like you are trying to hold back urine. Then relax for 5 seconds. Gradually work up to squeezing for 10 seconds. Do 3 sets of 15 repetitions a day, or as directed.         © Copyright [employer] 2022           back to top                          © Copyright [employer] 2022

## 2022-01-31 NOTE — ED PROVIDER NOTE - ATTENDING CONTRIBUTION TO CARE
I discussed the plan of care of the patient directly with the PA and examined the patient while in the Emergency Department. I agree with the HPI and PE as documented by the PA.  Pt is a 65yo f, h/o htn, hld, lung ca, baseline SaO2 89% on ra, who p/w sob only w/ exertion x 3 days. Also with non-prod cough, and generalized weakness. No f/c, n/v/d, abd pain, chest pain, palp... Afebrile. HDS. PE as above. Lungs cta b/l. + s1, s2, rrr. EKG non-ischemic. + wbc at 14. Trop and bnp neg. CXR neg for i/e. Plan for cta chest to r/o pe. Will continue to monitor.

## 2022-01-31 NOTE — ED PROVIDER NOTE - CLINICAL SUMMARY MEDICAL DECISION MAKING FREE TEXT BOX
65 y/o female with a PMHx of HTN, HLD, Lung CA is here in the ER c/o roberts x2-3 days associated with weakness/fatigue and non-productive cough. Lungs cta b/l. no LE swelling. No AC/AP use. High risk for PE, CTA chest to r/o. 67 y/o female with a PMHx of HTN, HLD, Lung CA is here in the ER c/o roberts x2-3 days associated with weakness/fatigue and non-productive cough. Lungs cta b/l. no LE swelling. No AC/AP use. High risk for PE, CTA chest to r/o. CT chest negative for PE. CT surgery consulted for findings. As per CT surgery, no surgical intervention indicated. Discussed with Dr. Prasad, plan for UTI treatment and will follow-up. Pt offered admission, however declined. I have discussed the discharge plan with the patient. The patient agrees with the plan, as discussed.  The patient understands Emergency Department diagnosis is a preliminary diagnosis often based on limited information and that the patient must adhere to the follow-up plan as discussed.  The patient understands that if the symptoms worsen or if prescribed medications do not have the desired/planned effect that the patient may return to the Emergency Department at any time for further evaluation and treatment.

## 2022-01-31 NOTE — ED PROVIDER NOTE - PATIENT PORTAL LINK FT
You can access the FollowMyHealth Patient Portal offered by [facility] by registering at the following website: [email or web address]. By joining [employer]’s FollowMyHealth portal, you will also be able to view your health information using other applications (apps) compatible with our system.

## 2022-01-31 NOTE — ED ADULT NURSE NOTE - CHPI ED NUR SYMPTOMS NEG
#[phone]  Pt states that Dr. Michael Botello wants her to have labs done. Pt wants to come to our lab in the office as that is earlier for her she states. Do you need to rewrite the order from Dr. Vignesh Sanabria so that pt can use our lab? Please call pt to advise what she can do. Thanks.
Orville Quevedo MD  You 2 hours ago (7:15 AM)   JS  Order cbc, ferritin, bmp    Message text        Anyi Rosales
no chills

## 2022-01-31 NOTE — ED PROVIDER NOTE - CARE PROVIDER_API CALL
Vimal Costa)  Hematology; Medical Oncology  [address], [address]  [place] [address]  Phone: [phone]  Fax: [phone]  Follow Up Time:

## 2022-01-31 NOTE — ED PROVIDER NOTE - OBJECTIVE STATEMENT
67 y/o female with a PMHx of HTN, HLD, Lung CA is here in the ER c/o roberts x2-3 days. Pt reports mild sob at rest, however increases with movement. Her associating symptoms include non-productive cough and weakness. Last chemo treatment was a week ago. She denies the following: fever, chills, travel, sick contacts, hx of dvt/pe, blood thinners, chest pain, dizziness, HA, syncope, blurred vision, double vision, confusion, difficulty walking, LE swelling, recent fall/injury.

## 2022-01-31 NOTE — ED ADULT TRIAGE NOTE - CHIEF COMPLAINT QUOTE
Pt presents to ED from 3rd floor radiology c/o chest pain that began this morning radiating to neck. Pt w/ hx of lung ca w/ chemo 2 weeks ago. Pt was being evaluated by IR for possible port malfunction. Pt on 4L NC. Denies fever, chills, palpitations, back pain, NVD.

## 2022-02-02 LAB
-  AMPICILLIN/SULBACTAM: SIGNIFICANT CHANGE UP
-  AMPICILLIN: SIGNIFICANT CHANGE UP
-  CEFAZOLIN: SIGNIFICANT CHANGE UP
-  CEFTRIAXONE: SIGNIFICANT CHANGE UP
-  CIPROFLOXACIN: SIGNIFICANT CHANGE UP
-  ERTAPENEM: SIGNIFICANT CHANGE UP
-  GENTAMICIN: SIGNIFICANT CHANGE UP
-  NITROFURANTOIN: SIGNIFICANT CHANGE UP
-  PIPERACILLIN/TAZOBACTAM: SIGNIFICANT CHANGE UP
-  TOBRAMYCIN: SIGNIFICANT CHANGE UP
-  TRIMETHOPRIM/SULFAMETHOXAZOLE: SIGNIFICANT CHANGE UP
CULTURE RESULTS: SIGNIFICANT CHANGE UP
METHOD TYPE: SIGNIFICANT CHANGE UP
ORGANISM # SPEC MICROSCOPIC CNT: SIGNIFICANT CHANGE UP
ORGANISM # SPEC MICROSCOPIC CNT: SIGNIFICANT CHANGE UP
SPECIMEN SOURCE: SIGNIFICANT CHANGE UP

## 2022-02-28 ENCOUNTER — INPATIENT (INPATIENT)
Facility: HOSPITAL | Age: 67
LOS: 3 days | Discharge: HOSPICE | DRG: 180 | End: 2022-03-04
Admitting: HOSPITALIST
Payer: MEDICARE

## 2022-02-28 VITALS
DIASTOLIC BLOOD PRESSURE: 103 MMHG | WEIGHT: 250 LBS | HEART RATE: 117 BPM | RESPIRATION RATE: 20 BRPM | HEIGHT: 70 IN | TEMPERATURE: 99 F | SYSTOLIC BLOOD PRESSURE: 149 MMHG | OXYGEN SATURATION: 92 %

## 2022-02-28 DIAGNOSIS — C34.90 MALIGNANT NEOPLASM OF UNSPECIFIED PART OF UNSPECIFIED BRONCHUS OR LUNG: ICD-10-CM

## 2022-02-28 DIAGNOSIS — J43.9 EMPHYSEMA, UNSPECIFIED: ICD-10-CM

## 2022-02-28 DIAGNOSIS — R62.7 ADULT FAILURE TO THRIVE: ICD-10-CM

## 2022-02-28 DIAGNOSIS — R63.8 OTHER SYMPTOMS AND SIGNS CONCERNING FOOD AND FLUID INTAKE: ICD-10-CM

## 2022-02-28 DIAGNOSIS — I10 ESSENTIAL (PRIMARY) HYPERTENSION: ICD-10-CM

## 2022-02-28 DIAGNOSIS — E78.5 HYPERLIPIDEMIA, UNSPECIFIED: ICD-10-CM

## 2022-02-28 DIAGNOSIS — F32.A DEPRESSION, UNSPECIFIED: ICD-10-CM

## 2022-02-28 LAB
ALBUMIN SERPL ELPH-MCNC: 3.9 G/DL — SIGNIFICANT CHANGE UP (ref 3.3–5)
ALP SERPL-CCNC: 59 U/L — SIGNIFICANT CHANGE UP (ref 40–120)
ALT FLD-CCNC: 29 U/L — SIGNIFICANT CHANGE UP (ref 10–45)
ANION GAP SERPL CALC-SCNC: 13 MMOL/L — SIGNIFICANT CHANGE UP (ref 5–17)
APPEARANCE UR: CLEAR — SIGNIFICANT CHANGE UP
APTT BLD: 29.8 SEC — SIGNIFICANT CHANGE UP (ref 27.5–35.5)
AST SERPL-CCNC: 30 U/L — SIGNIFICANT CHANGE UP (ref 10–40)
BACTERIA # UR AUTO: PRESENT /HPF
BASOPHILS # BLD AUTO: 0.02 K/UL — SIGNIFICANT CHANGE UP (ref 0–0.2)
BASOPHILS NFR BLD AUTO: 0.2 % — SIGNIFICANT CHANGE UP (ref 0–2)
BILIRUB SERPL-MCNC: 0.3 MG/DL — SIGNIFICANT CHANGE UP (ref 0.2–1.2)
BILIRUB UR-MCNC: NEGATIVE — SIGNIFICANT CHANGE UP
BUN SERPL-MCNC: 12 MG/DL — SIGNIFICANT CHANGE UP (ref 7–23)
CALCIUM SERPL-MCNC: 9.6 MG/DL — SIGNIFICANT CHANGE UP (ref 8.4–10.5)
CHLORIDE SERPL-SCNC: 103 MMOL/L — SIGNIFICANT CHANGE UP (ref 96–108)
CO2 SERPL-SCNC: 22 MMOL/L — SIGNIFICANT CHANGE UP (ref 22–31)
COLOR SPEC: YELLOW — SIGNIFICANT CHANGE UP
COMMENT - URINE: SIGNIFICANT CHANGE UP
CREAT SERPL-MCNC: 0.51 MG/DL — SIGNIFICANT CHANGE UP (ref 0.5–1.3)
DIFF PNL FLD: NEGATIVE — SIGNIFICANT CHANGE UP
EGFR: 104 ML/MIN/1.73M2 — SIGNIFICANT CHANGE UP
EOSINOPHIL # BLD AUTO: 0.01 K/UL — SIGNIFICANT CHANGE UP (ref 0–0.5)
EOSINOPHIL NFR BLD AUTO: 0.1 % — SIGNIFICANT CHANGE UP (ref 0–6)
EPI CELLS # UR: SIGNIFICANT CHANGE UP /HPF (ref 0–5)
GLUCOSE SERPL-MCNC: 151 MG/DL — HIGH (ref 70–99)
GLUCOSE UR QL: NEGATIVE — SIGNIFICANT CHANGE UP
HCT VFR BLD CALC: 42.3 % — SIGNIFICANT CHANGE UP (ref 34.5–45)
HGB BLD-MCNC: 14.1 G/DL — SIGNIFICANT CHANGE UP (ref 11.5–15.5)
IMM GRANULOCYTES NFR BLD AUTO: 0.4 % — SIGNIFICANT CHANGE UP (ref 0–1.5)
INR BLD: 1.14 — SIGNIFICANT CHANGE UP (ref 0.88–1.16)
KETONES UR-MCNC: ABNORMAL MG/DL
LEUKOCYTE ESTERASE UR-ACNC: ABNORMAL
LYMPHOCYTES # BLD AUTO: 1.86 K/UL — SIGNIFICANT CHANGE UP (ref 1–3.3)
LYMPHOCYTES # BLD AUTO: 16.7 % — SIGNIFICANT CHANGE UP (ref 13–44)
MCHC RBC-ENTMCNC: 29.9 PG — SIGNIFICANT CHANGE UP (ref 27–34)
MCHC RBC-ENTMCNC: 33.3 GM/DL — SIGNIFICANT CHANGE UP (ref 32–36)
MCV RBC AUTO: 89.8 FL — SIGNIFICANT CHANGE UP (ref 80–100)
MONOCYTES # BLD AUTO: 0.78 K/UL — SIGNIFICANT CHANGE UP (ref 0–0.9)
MONOCYTES NFR BLD AUTO: 7 % — SIGNIFICANT CHANGE UP (ref 2–14)
NEUTROPHILS # BLD AUTO: 8.43 K/UL — HIGH (ref 1.8–7.4)
NEUTROPHILS NFR BLD AUTO: 75.6 % — SIGNIFICANT CHANGE UP (ref 43–77)
NITRITE UR-MCNC: NEGATIVE — SIGNIFICANT CHANGE UP
NRBC # BLD: 0 /100 WBCS — SIGNIFICANT CHANGE UP (ref 0–0)
PH UR: 8.5 — HIGH (ref 5–8)
PLATELET # BLD AUTO: 300 K/UL — SIGNIFICANT CHANGE UP (ref 150–400)
POTASSIUM SERPL-MCNC: 4.5 MMOL/L — SIGNIFICANT CHANGE UP (ref 3.5–5.3)
POTASSIUM SERPL-SCNC: 4.5 MMOL/L — SIGNIFICANT CHANGE UP (ref 3.5–5.3)
PROT SERPL-MCNC: 7.4 G/DL — SIGNIFICANT CHANGE UP (ref 6–8.3)
PROT UR-MCNC: 100 MG/DL
PROTHROM AB SERPL-ACNC: 13.6 SEC — HIGH (ref 10.5–13.4)
RBC # BLD: 4.71 M/UL — SIGNIFICANT CHANGE UP (ref 3.8–5.2)
RBC # FLD: 14.6 % — HIGH (ref 10.3–14.5)
RBC CASTS # UR COMP ASSIST: < 5 /HPF — SIGNIFICANT CHANGE UP
SARS-COV-2 RNA SPEC QL NAA+PROBE: SIGNIFICANT CHANGE UP
SODIUM SERPL-SCNC: 138 MMOL/L — SIGNIFICANT CHANGE UP (ref 135–145)
SP GR SPEC: 1.02 — SIGNIFICANT CHANGE UP (ref 1–1.03)
UROBILINOGEN FLD QL: 0.2 E.U./DL — SIGNIFICANT CHANGE UP
WBC # BLD: 11.15 K/UL — HIGH (ref 3.8–10.5)
WBC # FLD AUTO: 11.15 K/UL — HIGH (ref 3.8–10.5)
WBC UR QL: < 5 /HPF — SIGNIFICANT CHANGE UP

## 2022-02-28 PROCEDURE — 71045 X-RAY EXAM CHEST 1 VIEW: CPT | Mod: 26

## 2022-02-28 PROCEDURE — 93010 ELECTROCARDIOGRAM REPORT: CPT

## 2022-02-28 PROCEDURE — 71275 CT ANGIOGRAPHY CHEST: CPT | Mod: 26,MA

## 2022-02-28 PROCEDURE — 99285 EMERGENCY DEPT VISIT HI MDM: CPT

## 2022-02-28 PROCEDURE — 99223 1ST HOSP IP/OBS HIGH 75: CPT | Mod: GC

## 2022-02-28 PROCEDURE — 70450 CT HEAD/BRAIN W/O DYE: CPT | Mod: 26,MA

## 2022-02-28 RX ORDER — ONDANSETRON 8 MG/1
4 TABLET, FILM COATED ORAL EVERY 6 HOURS
Refills: 0 | Status: DISCONTINUED | OUTPATIENT
Start: 2022-02-28 | End: 2022-03-04

## 2022-02-28 RX ORDER — HYDROMORPHONE HYDROCHLORIDE 2 MG/ML
0.5 INJECTION INTRAMUSCULAR; INTRAVENOUS; SUBCUTANEOUS EVERY 4 HOURS
Refills: 0 | Status: DISCONTINUED | OUTPATIENT
Start: 2022-02-28 | End: 2022-03-01

## 2022-02-28 RX ORDER — OXYCODONE AND ACETAMINOPHEN 5; 325 MG/1; MG/1
1 TABLET ORAL EVERY 4 HOURS
Refills: 0 | Status: DISCONTINUED | OUTPATIENT
Start: 2022-02-28 | End: 2022-03-01

## 2022-02-28 RX ORDER — SODIUM CHLORIDE 9 MG/ML
1000 INJECTION INTRAMUSCULAR; INTRAVENOUS; SUBCUTANEOUS ONCE
Refills: 0 | Status: COMPLETED | OUTPATIENT
Start: 2022-02-28 | End: 2022-02-28

## 2022-02-28 RX ORDER — ENOXAPARIN SODIUM 100 MG/ML
40 INJECTION SUBCUTANEOUS EVERY 12 HOURS
Refills: 0 | Status: DISCONTINUED | OUTPATIENT
Start: 2022-02-28 | End: 2022-03-04

## 2022-02-28 RX ORDER — FENTANYL CITRATE 50 UG/ML
1 INJECTION INTRAVENOUS
Qty: 0 | Refills: 0 | DISCHARGE

## 2022-02-28 RX ORDER — OXYCODONE HYDROCHLORIDE 5 MG/1
1 TABLET ORAL
Qty: 0 | Refills: 0 | DISCHARGE

## 2022-02-28 RX ORDER — DEXAMETHASONE 0.5 MG/5ML
10 ELIXIR ORAL ONCE
Refills: 0 | Status: DISCONTINUED | OUTPATIENT
Start: 2022-02-28 | End: 2022-02-28

## 2022-02-28 RX ORDER — DEXAMETHASONE 0.5 MG/5ML
10 ELIXIR ORAL ONCE
Refills: 0 | Status: COMPLETED | OUTPATIENT
Start: 2022-02-28 | End: 2022-02-28

## 2022-02-28 RX ORDER — IPRATROPIUM/ALBUTEROL SULFATE 18-103MCG
3 AEROSOL WITH ADAPTER (GRAM) INHALATION EVERY 6 HOURS
Refills: 0 | Status: COMPLETED | OUTPATIENT
Start: 2022-02-28 | End: 2022-03-01

## 2022-02-28 RX ADMIN — Medication 102 MILLIGRAM(S): at 16:11

## 2022-02-28 RX ADMIN — SODIUM CHLORIDE 1000 MILLILITER(S): 9 INJECTION INTRAMUSCULAR; INTRAVENOUS; SUBCUTANEOUS at 11:32

## 2022-02-28 RX ADMIN — SODIUM CHLORIDE 1000 MILLILITER(S): 9 INJECTION INTRAMUSCULAR; INTRAVENOUS; SUBCUTANEOUS at 16:04

## 2022-02-28 NOTE — H&P ADULT - PROBLEM SELECTOR PLAN 2
as above    #Postobstructive pneumonitis   involving the right lower lobe with encasement and obstruction of the bronchus intermedius, right middle and right lower lobe bronchi by confluent lymphadenopathy/mediastinal mass as above. Findings compatible with provided history of advanced stage small cell carcinoma. as above    #Postobstructive pneumonitis   CT chest showing the right lower lobe with encasement and obstruction of the bronchus intermedius, right middle and right lower lobe bronchi by confluent lymphadenopathy/mediastinal mass as above. Findings compatible with provided history of advanced stage small cell carcinoma. Patient with 1/4 SIRs on arrival. No concerns for overt infection. as above  -PT consult    #Postobstructive pneumonitis   CT chest showing the right lower lobe with encasement and obstruction of the bronchus intermedius, right middle and right lower lobe bronchi by confluent lymphadenopathy/mediastinal mass as above. Findings compatible with provided history of advanced stage small cell carcinoma. Patient with 1/4 SIRs on arrival. No concerns for overt infection. Immune response may be masked in setting of chemotherapy. However, patient with no fever, chills, worsened SOB, productive cough, or UTI like symtoms that would concern for infection.  -continue to monitor low threshold to start ABx if patient spikes temp

## 2022-02-28 NOTE — H&P ADULT - NSICDXPASTMEDICALHX_GEN_ALL_CORE_FT
PAST MEDICAL HISTORY:  Chronic obstructive pulmonary disease (COPD)     Emphysema of lung     Hypertension     Stage 4 lung cancer

## 2022-02-28 NOTE — H&P ADULT - PROBLEM SELECTOR PLAN 1
Patient diagnosed last Oct with Stage IV squamous cell carcinoma s/p 3 months of chemo (with Dr. Costa) per daughter with outpatient Pet SCan showing no improvement. Trialed on alternative regimen two weeks ago but could not tolerate. Patient with symptoms consistent with failure to thrive, and no longer wants any aggressive measures. CT scan this admission showing No significant interval change in bulky mediastinal and hilar lymphadenopathy with necrotic 5.2 cm right paratracheal lymph node and conglomerate mass/lymphadenopathy within the subcarinal region measuring 7.5 x 7.9 cm with narrowing and encasement of the bronchus intermedius and obstruction of both the right middle and right lower lobe ronchi. CT head-Extensive areas of vasogenic edema including in the right frontal lobe, left paramedian parietal lobe, and bilateral cerebellar hemispheres, findings concerning for intracranial metastatic disease in the setting of known malignancy.  -patient with capacity and decided to become DNR/DNI moving forward  -reach out to Dr. Costa in Am to obtain further collateral in regards to tx hx   -percocet for severe pain, breakthrough dilaudad ordered  -zofran prn for nausea  -f/u palliative consult in AM Patient diagnosed last Oct with Stage IV squamous cell carcinoma s/p 3 months of chemo (with Dr. Costa) per daughter with outpatient Pet SCan showing no improvement. Trialed on alternative regimen two weeks ago but could not tolerate. Patient with symptoms consistent with failure to thrive, and no longer wants any aggressive measures. CT scan this admission showing No significant interval change in bulky mediastinal and hilar lymphadenopathy with necrotic 5.2 cm right paratracheal lymph node and conglomerate mass/lymphadenopathy within the subcarinal region measuring 7.5 x 7.9 cm with narrowing and encasement of the bronchus intermedius and obstruction of both the right middle and right lower lobe ronchi. CT head-Extensive areas of vasogenic edema including in the right frontal lobe, left paramedian parietal lobe, and bilateral cerebellar hemispheres, findings concerning for intracranial metastatic disease in the setting of known malignancy.  -patient with capacity and decided to become DNR/DNI moving forward  -reach out to Dr. Costa in Am to obtain further collateral in regards to tx hx   -given mets to brain and vasogenic edema-->anti seizure ppx started (keppra 500 mg BID and decadron)  -percocet for severe pain, breakthrough dilaudad ordered  -zofran prn for nausea  -f/u palliative consult in AM

## 2022-02-28 NOTE — H&P ADULT - NSHPLABSRESULTS_GEN_ALL_CORE
.  LABS:                         14.1   11.15 )-----------( 300      ( 2022 11:37 )             42.3         138  |  103  |  12  ----------------------------<  151<H>  4.5   |  22  |  0.51    Ca    9.6      2022 11:37    TPro  7.4  /  Alb  3.9  /  TBili  0.3  /  DBili  x   /  AST  30  /  ALT  29  /  AlkPhos  59      PT/INR - ( 2022 11:37 )   PT: 13.6 sec;   INR: 1.14          PTT - ( 2022 11:37 )  PTT:29.8 sec  Urinalysis Basic - ( 2022 13:47 )    Color: Yellow / Appearance: Clear / S.020 / pH: x  Gluc: x / Ketone: Trace mg/dL  / Bili: Negative / Urobili: 0.2 E.U./dL   Blood: x / Protein: 100 mg/dL / Nitrite: NEGATIVE   Leuk Esterase: Trace / RBC: < 5 /HPF / WBC < 5 /HPF   Sq Epi: x / Non Sq Epi: 0-5 /HPF / Bacteria: Present /HPF                RADIOLOGY, EKG & ADDITIONAL TESTS: Reviewed.

## 2022-02-28 NOTE — H&P ADULT - PROBLEM SELECTOR PLAN 3
Patient with extensive smoking history. Per daughter she is on 4-5 L at home. However, satting 96% on 2 L here. No inc in sputum or cough. Not in COPD exacerbation  -goal 02 sat >88%  -standing duonebs. see above, started on keppra and decadron

## 2022-02-28 NOTE — H&P ADULT - NSHPPHYSICALEXAM_GEN_ALL_CORE
.  VITAL SIGNS:  T(C): 37.2 (02-28-22 @ 22:46), Max: 37.3 (02-28-22 @ 10:45)  T(F): 98.9 (02-28-22 @ 22:46), Max: 99.1 (02-28-22 @ 10:45)  HR: 109 (02-28-22 @ 22:46) (106 - 117)  BP: 141/80 (02-28-22 @ 22:46) (141/80 - 167/88)  BP(mean): --  RR: 22 (02-28-22 @ 22:46) (20 - 22)  SpO2: 93% (02-28-22 @ 22:46) (92% - 98%)  Wt(kg): --    PHYSICAL EXAM:    Constitutional: WDWN resting comfortably in bed; NAD, Morbidly Obese  Head: NC/AT  Eyes: PERRL, EOMI, anicteric sclera  ENT: no oropharyngeal erythema or exudates; dry MM  Neck: supple; no JVD  Respiratory: CTA B/L; no W/R/R, on 2 L NC  Cardiac: +S1/S2;tachycardic no M/R/G; PMI non-displaced  Gastrointestinal: soft, NT/ND; no rebound or guarding;  Extremities: WWP, no clubbing or cyanosis; pitting edema appreciated b/l  Musculoskeletal: NROM x4; no joint swelling, tenderness or erythema  Dermatologic: skin warm, dry and intact; no rashes, wounds, or scars  Neurologic: AAOx3; CNII-XII grossly intact; no focal deficits  Psychiatric: affect and characteristics of appearance, verbalizations, behaviors are appropriate

## 2022-02-28 NOTE — ED PROVIDER NOTE - NSICDXPASTMEDICALHX_GEN_ALL_CORE_FT
PAST MEDICAL HISTORY:  Hypertension       Chronic obstructive pulmonary disease (COPD)     Emphysema of lung     Stage 4 lung cancer

## 2022-02-28 NOTE — ED ADULT NURSE NOTE - OBJECTIVE STATEMENT
Presents for noticed AMS x 1 day per HHA and family- states normally self-care and AOx4, now AOx1-2 self and time with minimal interaction capabilities. On questioning, pt notes pain on urination. At baseline- aox4, walks with minimal assistance, bathroom with continence.     On assessment- AOx1-2 with some cooperation with questioning, breathing even and unlabored on RA with spo2 91-2 with improvement to 94 on 3L NC per home usual, no apparent distress, VS concernign for sepsis, neuro intact with no apparent facial asymmetry, PERRLA, equal strengths bilaterally.     Pt upgraded from triage with immediate ED RN and MD team at bedside. Placed on CM, EKG obtained, PIV established with labs sent. Medicated per verbal orders as reflected in MAR. Pt remains on CM, resting on stretcher, stable. On primafit.

## 2022-02-28 NOTE — ED ADULT NURSE NOTE - BEFAST SPEECH SLURRED
Transitional Care Follow Up Visit  Subjective     Carmelo Saucedo is a 63 y.o. male who presents for a transitional care management visit.    Within 48 business hours after discharge our office contacted him via telephone to coordinate his care and needs.      I reviewed and discussed the details of that call along with the discharge summary, hospital problems, inpatient lab results, inpatient diagnostic studies, and consultation reports with Carmelo.     Current outpatient and discharge medications have been reconciled for the patient.  Reviewed by: Gladys Tello MD      Date of TCM Phone Call 10/17/2019   [facility]   Date of Admission 10/3/2019   Date of Discharge 10/16/2019   Discharge Disposition Home or Self Care     Risk for Readmission (LACE) No Data Recorded    History of Present Illness   Accompanied by his Brother Jaun. He notes he has persisting short term memory loss.  Speech better and having persisting memory loss.  He reports he has been feeling different .  Broke out all over a couple of days ago new meds- topamax.  Sugars at home- 152, 135 and 125.      Course During Hospital Stay:  WAS ADMITTED to [facility] for FOR LITHIUM TOXICITY( Level > 3.0)  AND KIDNEY FAILURE from Sepsis AND SHORT TERM MEMORY LOSS. MEDS CHANGED , OFF LITHIUM, DM  MEDS AND GABAPENTIN. Transferred to [facility] for rehab, and dischged subsequently. Still has memory loss.     The following portions of the patient's history were reviewed and updated as appropriate: allergies, current medications, past family history, past medical history, past social history, past surgical history and problem list.    Review of Systems   Constitutional: Negative.  Negative for chills and fever.   HENT: Negative for ear discharge, ear pain, sinus pressure and sore throat.    Respiratory: Negative for cough, chest tightness and shortness of breath.    Cardiovascular: Negative for chest pain, palpitations and leg  swelling.   Gastrointestinal: Negative for diarrhea, nausea and vomiting.   Musculoskeletal: Negative for arthralgias, back pain and myalgias.   Neurological: Negative for dizziness, syncope and headaches.   Psychiatric/Behavioral: Positive for confusion and sleep disturbance.       Objective   Physical Exam   Constitutional: He is oriented to person, place, and time. He appears well-developed and well-nourished.   HENT:   Head: Normocephalic and atraumatic.   Right Ear: External ear normal.   Left Ear: External ear normal.   Mouth/Throat: No oropharyngeal exudate.   Eyes: Conjunctivae are normal. Pupils are equal, round, and reactive to light.   Neck: Neck supple. No thyromegaly present.   Cardiovascular: Normal rate, regular rhythm and intact distal pulses. Exam reveals no friction rub.   No murmur heard.  Pulmonary/Chest: Effort normal and breath sounds normal.   Abdominal: Soft. Bowel sounds are normal. He exhibits no distension. There is no tenderness.   Musculoskeletal: He exhibits no edema.   Neurological: He is alert and oriented to person, place, and time. No cranial nerve deficit.   Skin: Skin is warm and dry. Rash (mild papular rash over face and trunk) noted.   Psychiatric: He has a normal mood and affect. Judgment normal. His speech is delayed. He is slowed. He exhibits abnormal recent memory.   Nursing note and vitals reviewed.          Current Outpatient Medications:   •  acyclovir (ZOVIRAX) 400 MG tablet, take 1 tablet by mouth twice a day, Disp: 60 tablet, Rfl: 5  •  aspirin 81 MG chewable tablet, Chew 81 mg Daily., Disp: , Rfl:   •  atorvastatin (LIPITOR) 20 MG tablet, take 1 tablet by mouth every evening, Disp: 90 tablet, Rfl: 1  •  Cholecalciferol (VITAMIN D3) 5000 UNITS capsule capsule, Take 1 capsule by mouth daily., Disp: 30 capsule, Rfl: 5  •  clonazePAM (KlonoPIN) 1 MG tablet, Take 1 tablet by mouth 2 (Two) Times a Day., Disp: , Rfl: 0  •  COMBIVENT RESPIMAT  MCG/ACT inhaler, inhale 1  puff by mouth four times a day, Disp: 12 g, Rfl: 3  •  cyclobenzaprine (FLEXERIL) 10 MG tablet, take 1 tablet by mouth every evening, Disp: 90 tablet, Rfl: 1  •  cycloSPORINE (RESTASIS) 0.05 % ophthalmic emulsion, Administer 1 drop to both eyes Every Night., Disp: 5.5 mL, Rfl: 5  •  DULoxetine (CYMBALTA) 60 MG capsule, Take 60 mg by mouth Daily., Disp: , Rfl:   •  FLUoxetine (PROzac) 20 MG capsule, Take 1 capsule by mouth Daily., Disp: , Rfl: 0  •  FLUoxetine (PROzac) 40 MG capsule, Take 40 mg by mouth Daily., Disp: , Rfl:   •  hydrochlorothiazide (MICROZIDE) 12.5 MG capsule, take 1 capsule by mouth every morning, Disp: 90 capsule, Rfl: 1  •  HYDROcodone-acetaminophen (NORCO) 5-325 MG per tablet, Take 1 tablet by mouth Every 8 (Eight) Hours., Disp: , Rfl: 0  •  hydrocortisone (ANUSOL-HC) 2.5 % rectal cream, Insert  into the rectum 2 (Two) Times a Day., Disp: 30 g, Rfl: 3  •  ipratropium-albuterol (DUONEB) 0.5-2.5 mg/3 ml nebulizer, Take 3 mL by nebulization Every 6 (Six) Hours., Disp: 120 vial, Rfl: 3  •  levothyroxine (SYNTHROID, LEVOTHROID) 88 MCG tablet, Take 1 tablet by mouth Daily., Disp: 90 tablet, Rfl: 1  •  linagliptin (TRADJENTA) 5 MG tablet tablet, Take 1 tablet by mouth Daily., Disp: 90 tablet, Rfl: 1  •  lisinopril (PRINIVIL,ZESTRIL) 5 MG tablet, Take 1 tablet by mouth Daily., Disp: 90 tablet, Rfl: 1  •  meloxicam (MOBIC) 15 MG tablet, take 1 tablet by mouth once daily with food, Disp: 90 tablet, Rfl: 0  •  metoprolol succinate XL (TOPROL XL) 50 MG 24 hr tablet, Take 1 tablet by mouth Daily., Disp: 90 tablet, Rfl: 0  •  nystatin (MYCOSTATIN) 371243 UNIT/GM cream, Apply  topically 2 (Two) Times a Day., Disp: 15 g, Rfl: 0  •  pantoprazole (PROTONIX) 40 MG EC tablet, take 1 tablet by mouth once daily, Disp: 90 tablet, Rfl: 1  •  QUEtiapine (SEROquel) 400 MG tablet, Take 2 tablets by mouth every night., Disp: , Rfl:   •  tamsulosin (FLOMAX) 0.4 MG capsule 24 hr capsule, Take 2 capsules by mouth Every Night.,  "Disp: 30 capsule, Rfl: 0  •  topiramate (TOPAMAX) 50 MG tablet, Take 2 tablets by mouth Every Night., Disp: 30 tablet, Rfl: 3  •  traZODone (DESYREL) 100 MG tablet, Take 2 tablets by mouth Every Night., Disp: , Rfl: 0  •  vitamin D (ERGOCALCIFEROL) 89581 units capsule capsule, Take 1 capsule by mouth 1 (One) Time Per Week., Disp: 4 capsule, Rfl: 5  •  glucose blood (NADEEM CONTOUR NEXT TEST) test strip, 1 strip daily., Disp: , Rfl:   •  Lancets (FREESTYLE) lancets, FOR ONCE DAILY TESTING, Disp: 100 each, Rfl: 3  •  loratadine (CLARITIN) 10 MG tablet, Take 1 tablet by mouth Daily., Disp: 30 tablet, Rfl: 5  •  metFORMIN (GLUCOPHAGE) 1000 MG tablet, Take 1.5 tabs AM and 1 tab PM by mouth., Disp: 225 tablet, Rfl: 1  •  nitroglycerin (NITROSTAT) 0.4 MG SL tablet, Place 1 tablet under the tongue Every 5 (Five) Minutes As Needed for Chest Pain. Only take up to 3 tablets. Call 911 if pain persists., Disp: 30 tablet, Rfl: 0      /60   Pulse 72   Ht 185.4 cm (73\")   Wt 114 kg (251 lb)   SpO2 99%   BMI 33.12 kg/m²       Results for orders placed or performed in visit on 10/23/19   Basic Metabolic Panel   Result Value Ref Range    Glucose 115 (H) 65 - 99 mg/dL    BUN 6 (L) 8 - 23 mg/dL    Creatinine 1.67 (H) 0.76 - 1.27 mg/dL    Sodium 141 136 - 145 mmol/L    Potassium 4.2 3.5 - 5.2 mmol/L    Chloride 104 98 - 107 mmol/L    CO2 22.3 22.0 - 29.0 mmol/L    Calcium 9.9 8.6 - 10.5 mg/dL    eGFR Non African Amer 42 (L) >60 mL/min/1.73    BUN/Creatinine Ratio 3.6 (L) 7.0 - 25.0    Anion Gap 14.7 5.0 - 15.0 mmol/L   CBC (No Diff)   Result Value Ref Range    WBC 8.58 3.40 - 10.80 10*3/mm3    RBC 4.44 4.14 - 5.80 10*6/mm3    Hemoglobin 14.2 13.0 - 17.7 g/dL    Hematocrit 41.7 37.5 - 51.0 %    MCV 93.9 79.0 - 97.0 fL    MCH 32.0 26.6 - 33.0 pg    MCHC 34.1 31.5 - 35.7 g/dL    RDW 12.9 12.3 - 15.4 %    RDW-SD 44.4 37.0 - 54.0 fl    MPV 12.3 (H) 6.0 - 12.0 fL    Platelets 234 140 - 450 10*3/mm3   POC Glycosylated Hemoglobin (Hb " A1C)   Result Value Ref Range    Hemoglobin A1C 6.3 %             Assessment/Plan   Diagnoses and all orders for this visit:    Type 2 diabetes mellitus with other specified complication, without long-term current use of insulin (CMS/[facility])  Comments:  off meds. A1C okay, monitor for now, check bmp, and if sugars higher, will gradually add back.  Orders:  -     POC Glycosylated Hemoglobin (Hb A1C)    Chronic prostatitis  -     Discontinue: tamsulosin (FLOMAX) 0.4 MG capsule 24 hr capsule; Take 2 capsules by mouth Every Night.  -     tamsulosin (FLOMAX) 0.4 MG capsule 24 hr capsule; Take 2 capsules by mouth Every Night.    Rash  -     loratadine (CLARITIN) 10 MG tablet; Take 1 tablet by mouth Daily.    Migraine without status migrainosus, not intractable, unspecified migraine type  -     topiramate (TOPAMAX) 50 MG tablet; Take 2 tablets by mouth Every Night.    TAYLOR (acute kidney injury) (CMS/[facility])  -     Basic Metabolic Panel  -     CBC (No Diff)    check labs to eval kidney function and blood counts. Monitor sugars, and call if > 300.        Current outpatient and discharge medications have been reconciled for the patient.  Reviewed by: Gladys Tello MD      Return in about 6 weeks (around 12/4/2019) for Next scheduled follow up.   No

## 2022-02-28 NOTE — H&P ADULT - PROBLEM SELECTOR PLAN 7
Regular diet  replete lytes as needed  lovenox  no GI PPx  DNR DNI    Dispo: floors hx of wellbutrin and lexapro. avoiding pill burden

## 2022-02-28 NOTE — H&P ADULT - HISTORY OF PRESENT ILLNESS
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 Patient started getting SOB last summer and it continue to progress. Patient eventually got CT scan performed 10/21 which was concerning for lung Ca. Patient had biopsy performed and was diagnosed with Stage IV squamos cell cancer. She was then referred to Dr. Costa who immediately started chemotherapy (she got chemo 3 x week then would have a 2 week off period. did this for 9 cycles roughly 3 months). Dr. Costa performed pet scan that showed no improvement and then offered patient alternative tx 2 weeks ago. However, patient immediately felt side effects and could not tolerate the chemo. She decided she no longer wants to pursue anymore aggresive therapy or interventions. Today daughter states she was going to take patient to another Oncologist for last resort second opinion. However, patient became to weak and could not even move which prompted them to come to the ED. Patient states she has been fatigued since last friday, has had dec PO intake, felt intermittent palpitations yesterday, intermittent nausea. States her SOB is at baseline and she has a nonproductive cough. Denies fever chills, vomiting, diarrhea, dysuria, frequency, urgency, cough. Daughter states that since intial diagnosis patient has stopped taking all her medications except pain medications (fentanyl 100 mc patch inc from 75 mcg this past weekend by outpatient palliative, and oxy 15 mg IR). Patient currently denying pain.     ED Course: 99.1 F, 117 HR, 149/103 BP,20 RR, 98% on 2 l  WBC 11.15, BMP unremarkable, glucose 151  ua (-)  CT chest-No significant interval change in bulky mediastinal and hilar lymphadenopathy with necrotic 5.2 cm right paratracheal lymph node and conglomerate mass/lymphadenopathy within the subcarinal region measuring 7.5 x 7.9 cm with narrowing and encasement of the bronchus intermedius and obstruction of both the right middle and right lower lobe ronchi.Postobstructive pneumonitis involving the right lower lobe with encasement and obstruction of the bronchus intermedius, right middle and right lower lobe bronchi by confluent lymphadenopathy/mediastinal mass as above. Findings compatible with provided history of advanced stage small cell carcinoma.  CT head-Extensive areas of vasogenic edema including in the right frontal lobe, left paramedian parietal lobe, and bilateral cerebellar hemispheres, findings concerning for intracranial metastatic disease in the setting of known malignancy. Dedicated MRI brain with and without contrast is recommended for further assessment.    Prior medications:amLODIPine Besylate 5 MG, ARIPiprazole 2 MG Oral Tablet,Atorvastatin Calcium 20 MG, BuPROPion HCl ER (SR) 200 MG   FLUoxetine HCl - 40 MG Oral Capsule  Allergies-none  Social hx- smoked 1.5 ppd (until Ca diagnosis then smoked 3-4 cigs until 2 weeks ago), denies drinking/drug use, ambulates with walker, lives with daughter Eve ([phone])

## 2022-02-28 NOTE — H&P ADULT - ASSESSMENT
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 Patient admitted for further management and palliative assessment.

## 2022-02-28 NOTE — ED PROVIDER NOTE - PROGRESS NOTE DETAILS
Pt signed out to me by Dr Gore. elderly F with metastatic lung ca to brain with intermittent AMS, generalized weakness. workup remarkable only for CTH with vasogenic edema - steroids given. alert and answering questions appropriately to me. CTA neg for PE. admit for steroids and palliative consultation

## 2022-02-28 NOTE — ED ADULT TRIAGE NOTE - CHIEF COMPLAINT QUOTE
BIBA from home c.o AMS. x 24hrs of being more "forgetful" as per caregiver at bedside. PMH of stage 4 ca. on 3-4 L NC PRN. baseline mental status a&o3, currently a&ox 1.

## 2022-02-28 NOTE — ED PROVIDER NOTE - OBJECTIVE STATEMENT
65 y/o F w/ Hx of stage VI squamous cell lung CA w/ mets to brain, liver, and lymph nodes, on chemo, HTN, COPD, emphysema on home O2 4.5L per min, BIBEMS for not feeling well for the past 3 dyas. Pt reports feeling very fatigued and low energy. Today pt confused, not recognizing people, and not at baseline mental status. Brought to ED for evaluation. Of note pt had fentanyl dose changed from 75 to 100mg 3 days ago. Daughter not sure if symptoms are correlated. Pt also started on new chemo regime last week. No fever, chills, urinary symptoms, CP, SOB, abd pain, N/V/D. Does admit to mild dysuria. No urgency, hematuria, or flank pain. No head trauma or head injuries. 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 Pt reports feeling very fatigued and low energy. Today pt noted to be confused and not at baseline mental status. Of note pt had fentanyl dose increased from 75 to 100mcg 3 days ago. Daughter is not sure if symptoms are related. Pt also started on new chemo regime last week. No fever, chills, uri symptoms, CP, SOB, abd pain, N/V/D. Does admit to mild dysuria. No urgency, hematuria, or flank pain. No head trauma or head injuries.

## 2022-02-28 NOTE — H&P ADULT - CONVERSATION DETAILS
Discussed overall diagnosis and prognosis with both patient and daughter. During encounter, patient deemed to have capacity and ability to make her own decisions. Patient and daughter expressed understanding of aggressive nature of cancer. Patient stressed she no longer wants any aggressive measures to be performed including chemotherapy. She would like to allow a natural death, and does not want CPR or intubation moving forward. She would like to be comfortable and have symptom management. Daughter would like to speak with palliative care about the next steps moving forward. BRYAN signed and in chart.

## 2022-02-28 NOTE — H&P ADULT - PROBLEM SELECTOR PLAN 6
hx of wellbutrin and lexapro. avoiding pill burden Patient does not want pill burden  -hold lipitor 20 mg

## 2022-02-28 NOTE — H&P ADULT - PROBLEM SELECTOR PLAN 5
Patient does not want pill burden  -hold lipitor 20 mg Patient with hx of being norvasc has stopped all medications since october. SBP in 140s.   -continue to monitor pressures. If worsening HTN can consider restarting norvasc 5 mg

## 2022-02-28 NOTE — ED PROVIDER NOTE - PHYSICAL EXAMINATION
VITAL SIGNS: I have reviewed nursing notes and confirm.  CONSTITUTIONAL: In no acute distress.   SKIN:  warm and dry, no acute rash.   HEAD:  normocephalic, atraumatic.  EYES: EOM intact; conjunctiva and sclera clear.  ENT: No nasal discharge; airway clear.   NECK: Supple; non tender.  CARD: Mildly tachycardic. S1, S2 normal; no murmurs, gallops, or rubs. Regular rhythm.   RESP:  Clear to auscultation b/l, no wheezes, rales or rhonchi.  ABD: Normal bowel sounds; soft; non-distended; non-tender; no guarding/ rebound.  EXT: Normal ROM. No clubbing, cyanosis or edema. 2+ pulses to b/l ue/le.  NEURO: Alert, oriented to name and "hospital" only. grossly unremarkable  PSYCH: Cooperative, mood and affect appropriate. VITAL SIGNS: I have reviewed nursing notes and confirm.  CONSTITUTIONAL: In no acute distress.   SKIN:  warm and dry, no acute rash.   HEAD:  normocephalic, atraumatic.  EYES: EOM intact; conjunctiva and sclera clear.  ENT: No nasal discharge; airway clear.   NECK: Supple; non tender.  CHESTWALL: + mediport to r chest, no overlying erythema/ warmth.   CARD: Mildly tachycardic. s1, s2.   RESP:  Clear to auscultation b/l, no wheezes, rales or rhonchi.  ABD: Normal bowel sounds; soft; non-distended; non-tender; no guarding/ rebound.  EXT: Normal ROM. No clubbing, cyanosis or edema. 2+ pulses to b/l ue/le.  NEURO: Alert, oriented to name and hospital only. CN grossly intact. Motor/ sensation intact and equal b/l. Neg pronator drift.   PSYCH: Cooperative.

## 2022-02-28 NOTE — H&P ADULT - ATTENDING COMMENTS
reviewed pertinent data , h&p  patient seen and examined overnight   a/f FTT in setting of metastatic disease     PE as above except pt w/ mildly dry MM (drank water), on NC, nonpitting lower extremity edema b/l; pt w/ 100mcg fentanyl noted on right arm to be changed on 3/1/2022    1. FTT in setting of stage 4 lung cancer: signed MOLST form for DNR/DNI; followup onc and palliative consults. started on keppra and decadron overnight. pain control. c/w fentanyl, pt reports pain control except when coughing/ sneezing. palliative consult for likely hospice care planning.        rest of  plan as above

## 2022-02-28 NOTE — H&P ADULT - PROBLEM SELECTOR PLAN 4
Patient with hx of being norvasc has stopped all medications since october. SBP in 140s.   -continue to monitor pressures. If worsening HTN can consider restarting norvasc 5 mg Patient with extensive smoking history. Per daughter she is on 4-5 L at home. However, satting 96% on 2 L here. No inc in sputum or cough. Not in COPD exacerbation  -goal 02 sat >88%  -standing duonebs.

## 2022-02-28 NOTE — ED PROVIDER NOTE - CLINICAL SUMMARY MEDICAL DECISION MAKING FREE TEXT BOX
Impression: stage VI squamous cell lung CA w/ mets to brain, w/ confusion. Neuro exam nonfocal other than mental status exam. ?change in mental status due to increased dose in pain meds vs. worsening brain mets vs. intracranial hemorrhage vs. infection. Plan to check labs, UA, CXR, CT head, and consult w/ Dr. Costa. Impression: stage 4 squamous cell lung CA w/ mets to brain, liver, lymphnodes, here w/ confusion. Neuro exam nonfocal other than mental status. Labs reviewed w/ findings of mild leukocytosis to 11, otherwise stable hg/hct, plt. Electrolytes/ renal function intact. UA neg for infeciton. CXR showing prominent mediastinum, also noted on prior CXR. CT head showing vasogenic edema 2/2 brain mets, no bleed. Case d/w Dr. Costa. Cancer has been resistant to treatment thus far and pt is reluctant to pursue further treatment. Will give dexamethasone for edema. Given persistent tachycardia, will arrange for cta chest to r/o pe. Will admit to hospitalist svc for further management.

## 2022-03-01 DIAGNOSIS — K59.00 CONSTIPATION, UNSPECIFIED: ICD-10-CM

## 2022-03-01 DIAGNOSIS — G89.3 NEOPLASM RELATED PAIN (ACUTE) (CHRONIC): ICD-10-CM

## 2022-03-01 DIAGNOSIS — G93.6 CEREBRAL EDEMA: ICD-10-CM

## 2022-03-01 DIAGNOSIS — R11.0 NAUSEA: ICD-10-CM

## 2022-03-01 DIAGNOSIS — Z71.89 OTHER SPECIFIED COUNSELING: ICD-10-CM

## 2022-03-01 DIAGNOSIS — Z51.5 ENCOUNTER FOR PALLIATIVE CARE: ICD-10-CM

## 2022-03-01 LAB
GLUCOSE BLDC GLUCOMTR-MCNC: 140 MG/DL — HIGH (ref 70–99)
GLUCOSE BLDC GLUCOMTR-MCNC: 156 MG/DL — HIGH (ref 70–99)
GLUCOSE BLDC GLUCOMTR-MCNC: 191 MG/DL — HIGH (ref 70–99)
GLUCOSE BLDC GLUCOMTR-MCNC: 199 MG/DL — HIGH (ref 70–99)
HCV AB S/CO SERPL IA: 0.07 S/CO — SIGNIFICANT CHANGE UP
HCV AB SERPL-IMP: SIGNIFICANT CHANGE UP

## 2022-03-01 PROCEDURE — 99497 ADVNCD CARE PLAN 30 MIN: CPT | Mod: 25

## 2022-03-01 PROCEDURE — 99358 PROLONG SERVICE W/O CONTACT: CPT | Mod: NC

## 2022-03-01 PROCEDURE — 99498 ADVNCD CARE PLAN ADDL 30 MIN: CPT

## 2022-03-01 PROCEDURE — 99223 1ST HOSP IP/OBS HIGH 75: CPT

## 2022-03-01 PROCEDURE — 99233 SBSQ HOSP IP/OBS HIGH 50: CPT | Mod: GC

## 2022-03-01 PROCEDURE — 99222 1ST HOSP IP/OBS MODERATE 55: CPT

## 2022-03-01 RX ORDER — SODIUM CHLORIDE 9 MG/ML
1000 INJECTION, SOLUTION INTRAVENOUS
Refills: 0 | Status: DISCONTINUED | OUTPATIENT
Start: 2022-03-01 | End: 2022-03-04

## 2022-03-01 RX ORDER — FENTANYL CITRATE 50 UG/ML
1 INJECTION INTRAVENOUS
Refills: 0 | Status: DISCONTINUED | OUTPATIENT
Start: 2022-03-01 | End: 2022-03-03

## 2022-03-01 RX ORDER — PANTOPRAZOLE SODIUM 20 MG/1
40 TABLET, DELAYED RELEASE ORAL
Refills: 0 | Status: DISCONTINUED | OUTPATIENT
Start: 2022-03-01 | End: 2022-03-04

## 2022-03-01 RX ORDER — POLYETHYLENE GLYCOL 3350 17 G/17G
17 POWDER, FOR SOLUTION ORAL DAILY
Refills: 0 | Status: DISCONTINUED | OUTPATIENT
Start: 2022-03-01 | End: 2022-03-04

## 2022-03-01 RX ORDER — GLUCAGON INJECTION, SOLUTION 0.5 MG/.1ML
1 INJECTION, SOLUTION SUBCUTANEOUS ONCE
Refills: 0 | Status: DISCONTINUED | OUTPATIENT
Start: 2022-03-01 | End: 2022-03-04

## 2022-03-01 RX ORDER — DEXTROSE 50 % IN WATER 50 %
25 SYRINGE (ML) INTRAVENOUS ONCE
Refills: 0 | Status: DISCONTINUED | OUTPATIENT
Start: 2022-03-01 | End: 2022-03-04

## 2022-03-01 RX ORDER — DEXAMETHASONE 0.5 MG/5ML
6 ELIXIR ORAL DAILY
Refills: 0 | Status: DISCONTINUED | OUTPATIENT
Start: 2022-03-01 | End: 2022-03-04

## 2022-03-01 RX ORDER — OXYCODONE HYDROCHLORIDE 5 MG/1
20 TABLET ORAL EVERY 4 HOURS
Refills: 0 | Status: DISCONTINUED | OUTPATIENT
Start: 2022-03-01 | End: 2022-03-04

## 2022-03-01 RX ORDER — LEVETIRACETAM 250 MG/1
500 TABLET, FILM COATED ORAL EVERY 12 HOURS
Refills: 0 | Status: DISCONTINUED | OUTPATIENT
Start: 2022-03-01 | End: 2022-03-04

## 2022-03-01 RX ORDER — DEXTROSE 50 % IN WATER 50 %
15 SYRINGE (ML) INTRAVENOUS ONCE
Refills: 0 | Status: DISCONTINUED | OUTPATIENT
Start: 2022-03-01 | End: 2022-03-04

## 2022-03-01 RX ORDER — INSULIN LISPRO 100/ML
VIAL (ML) SUBCUTANEOUS
Refills: 0 | Status: DISCONTINUED | OUTPATIENT
Start: 2022-03-01 | End: 2022-03-04

## 2022-03-01 RX ORDER — INFLUENZA VIRUS VACCINE 15; 15; 15; 15 UG/.5ML; UG/.5ML; UG/.5ML; UG/.5ML
0.5 SUSPENSION INTRAMUSCULAR ONCE
Refills: 0 | Status: DISCONTINUED | OUTPATIENT
Start: 2022-03-01 | End: 2022-03-04

## 2022-03-01 RX ORDER — OXYCODONE HYDROCHLORIDE 5 MG/1
10 TABLET ORAL EVERY 4 HOURS
Refills: 0 | Status: DISCONTINUED | OUTPATIENT
Start: 2022-03-01 | End: 2022-03-04

## 2022-03-01 RX ORDER — SENNA PLUS 8.6 MG/1
2 TABLET ORAL AT BEDTIME
Refills: 0 | Status: DISCONTINUED | OUTPATIENT
Start: 2022-03-01 | End: 2022-03-04

## 2022-03-01 RX ORDER — DEXTROSE 50 % IN WATER 50 %
12.5 SYRINGE (ML) INTRAVENOUS ONCE
Refills: 0 | Status: DISCONTINUED | OUTPATIENT
Start: 2022-03-01 | End: 2022-03-04

## 2022-03-01 RX ORDER — HYDROMORPHONE HYDROCHLORIDE 2 MG/ML
1 INJECTION INTRAMUSCULAR; INTRAVENOUS; SUBCUTANEOUS EVERY 4 HOURS
Refills: 0 | Status: DISCONTINUED | OUTPATIENT
Start: 2022-03-01 | End: 2022-03-04

## 2022-03-01 RX ADMIN — OXYCODONE HYDROCHLORIDE 20 MILLIGRAM(S): 5 TABLET ORAL at 23:39

## 2022-03-01 RX ADMIN — FENTANYL CITRATE 1 PATCH: 50 INJECTION INTRAVENOUS at 10:44

## 2022-03-01 RX ADMIN — Medication 3 MILLILITER(S): at 04:06

## 2022-03-01 RX ADMIN — PANTOPRAZOLE SODIUM 40 MILLIGRAM(S): 20 TABLET, DELAYED RELEASE ORAL at 06:50

## 2022-03-01 RX ADMIN — ENOXAPARIN SODIUM 40 MILLIGRAM(S): 100 INJECTION SUBCUTANEOUS at 00:11

## 2022-03-01 RX ADMIN — Medication 3 MILLILITER(S): at 10:46

## 2022-03-01 RX ADMIN — SENNA PLUS 2 TABLET(S): 8.6 TABLET ORAL at 22:27

## 2022-03-01 RX ADMIN — Medication 3 MILLILITER(S): at 22:26

## 2022-03-01 RX ADMIN — Medication 2: at 12:48

## 2022-03-01 RX ADMIN — POLYETHYLENE GLYCOL 3350 17 GRAM(S): 17 POWDER, FOR SOLUTION ORAL at 12:48

## 2022-03-01 RX ADMIN — FENTANYL CITRATE 1 PATCH: 50 INJECTION INTRAVENOUS at 19:00

## 2022-03-01 RX ADMIN — Medication 3 MILLILITER(S): at 19:03

## 2022-03-01 RX ADMIN — ENOXAPARIN SODIUM 40 MILLIGRAM(S): 100 INJECTION SUBCUTANEOUS at 10:46

## 2022-03-01 RX ADMIN — LEVETIRACETAM 500 MILLIGRAM(S): 250 TABLET, FILM COATED ORAL at 19:03

## 2022-03-01 RX ADMIN — ENOXAPARIN SODIUM 40 MILLIGRAM(S): 100 INJECTION SUBCUTANEOUS at 22:26

## 2022-03-01 RX ADMIN — LEVETIRACETAM 500 MILLIGRAM(S): 250 TABLET, FILM COATED ORAL at 07:29

## 2022-03-01 RX ADMIN — Medication 6 MILLIGRAM(S): at 06:50

## 2022-03-01 NOTE — CONSULT NOTE ADULT - PROBLEM SELECTOR RECOMMENDATION 5
.  - DNR DNI Molst completed 2/28  - .  - DNR DNI Molst completed 2/28  - Discussion as above  - GOC Pending further discussions between onc and patient/family  - Family with appropriate insight into prognosis, would like inpatient [facility] for EOL care when decision made to transition to symptom directed care

## 2022-03-01 NOTE — PROGRESS NOTE ADULT - SUBJECTIVE AND OBJECTIVE BOX
*INCOMPLETE NOTE*    Hospital course:    INTERVAL HPI/OVERNIGHT EVENTS:  As per night team, no overnight events. Patient seen and examined at bedside. Patient denies fever, chills, dizziness, weakness, HA, CP, SOB, N/V/D/C    VITALS  Vital Signs Last 24 Hrs  T(C): 37.3 (01 Mar 2022 05:21), Max: 37.3 (2022 10:45)  T(F): 99.1 (01 Mar 2022 05:21), Max: 99.1 (2022 10:45)  HR: 108 (01 Mar 2022 05:21) (106 - 117)  BP: 137/84 (01 Mar 2022 05:21) (137/84 - 167/88)  BP(mean): --  RR: 20 (01 Mar 2022 05:21) (20 - 22)  SpO2: 94% (01 Mar 2022 05:21) (91% - 98%)    CAPILLARY BLOOD GLUCOSE          PHYSICAL EXAM  General: NAD, sitting comfortably in bed   HEENT: PERRL/ EOMI, no scleral icterus, MMM  Neck: Supple, no JVD  Respiratory: lungs CTA b/l, no wheezes/crackles, no accessory muscle use  Cardiovascular: Regular rhythm/rate; +S1 +S2, no murmurs  Gastrointestinal: Soft, NTND, normoactive BS, no rebound, no guarding  Genitourinary: no suprapubic tenderness  Extremities: WWP, no cyanosis, no clubbing, no edema, pulses equal  Neurological: A&Ox3, no gross focal deficits, follows commands  Skin: Normal temperature, warm, dry    MEDICATIONS  (STANDING):  albuterol/ipratropium for Nebulization 3 milliLiter(s) Nebulizer every 6 hours  dexAMETHasone     Tablet 6 milliGRAM(s) Oral daily  dextrose 40% Gel 15 Gram(s) Oral once  dextrose 5%. 1000 milliLiter(s) (50 mL/Hr) IV Continuous <Continuous>  dextrose 5%. 1000 milliLiter(s) (100 mL/Hr) IV Continuous <Continuous>  dextrose 50% Injectable 25 Gram(s) IV Push once  dextrose 50% Injectable 12.5 Gram(s) IV Push once  dextrose 50% Injectable 25 Gram(s) IV Push once  enoxaparin Injectable 40 milliGRAM(s) SubCutaneous every 12 hours  fentaNYL   Patch 100 MICROgram(s)/Hr 1 Patch Transdermal every 72 hours  glucagon  Injectable 1 milliGRAM(s) IntraMuscular once  influenza   Vaccine 0.5 milliLiter(s) IntraMuscular once  insulin lispro (ADMELOG) corrective regimen sliding scale   SubCutaneous Before meals and at bedtime  levETIRAcetam 500 milliGRAM(s) Oral every 12 hours  pantoprazole    Tablet 40 milliGRAM(s) Oral before breakfast  polyethylene glycol 3350 17 Gram(s) Oral daily  senna 2 Tablet(s) Oral at bedtime    MEDICATIONS  (PRN):  HYDROmorphone  Injectable 0.5 milliGRAM(s) IV Push every 4 hours PRN Breakthrough pain  ondansetron Injectable 4 milliGRAM(s) IV Push every 6 hours PRN Nausea and/or Vomiting  oxycodone    5 mG/acetaminophen 325 mG 1 Tablet(s) Oral every 4 hours PRN Severe Pain (7 - 10)      No Known Allergies      LABS                        14.1   11.15 )-----------( 300      ( 2022 11:37 )             42.3         138  |  103  |  12  ----------------------------<  151<H>  4.5   |  22  |  0.51    Ca    9.6      2022 11:37    TPro  7.4  /  Alb  3.9  /  TBili  0.3  /  DBili  x   /  AST  30  /  ALT  29  /  AlkPhos  59      PT/INR - ( 2022 11:37 )   PT: 13.6 sec;   INR: 1.14          PTT - ( 2022 11:37 )  PTT:29.8 sec  Urinalysis Basic - ( 2022 13:47 )    Color: Yellow / Appearance: Clear / S.020 / pH: x  Gluc: x / Ketone: Trace mg/dL  / Bili: Negative / Urobili: 0.2 E.U./dL   Blood: x / Protein: 100 mg/dL / Nitrite: NEGATIVE   Leuk Esterase: Trace / RBC: < 5 /HPF / WBC < 5 /HPF   Sq Epi: x / Non Sq Epi: 0-5 /HPF / Bacteria: Present /HPF            RADIOLOGY & ADDITIONAL TESTS: Reviewed   INTERVAL HPI/OVERNIGHT EVENTS:  As per night team, no overnight events. Patient seen and examined at bedside. Patient denies pain, fever, chills, dizziness, weakness, HA, CP, SOB, N/V/D/C. Pt in good spirits despite situation.      VITALS  Vital Signs Last 24 Hrs  T(C): 37.3 (01 Mar 2022 05:21), Max: 37.3 (2022 10:45)  T(F): 99.1 (01 Mar 2022 05:21), Max: 99.1 (2022 10:45)  HR: 108 (01 Mar 2022 05:21) (106 - 117)  BP: 137/84 (01 Mar 2022 05:21) (137/84 - 167/88)  BP(mean): --  RR: 20 (01 Mar 2022 05:21) (20 - 22)  SpO2: 94% (01 Mar 2022 05:21) (91% - 98%)    CAPILLARY BLOOD GLUCOSE          PHYSICAL EXAM  General: NAD, sitting comfortably in bed, obese F  HEENT: PERRL/ EOMI, no scleral icterus, MMM  Neck: Supple, no JVD  Respiratory: lungs CTA b/l, slight decr BS at RLL but limited by body habitus, no wheezes/crackles, no accessory muscle use  Cardiovascular: Regular rhythm/rate; +S1 +S2, no murmurs  Gastrointestinal: Soft, NTND, normoactive BS, no rebound, no guarding  Extremities: WWP, no cyanosis, no clubbing, no edema, pulses equal  Neurological: A&Ox3, no gross focal deficits, follows commands  Skin: Normal temperature, warm, dry      MEDICATIONS  (STANDING):  albuterol/ipratropium for Nebulization 3 milliLiter(s) Nebulizer every 6 hours  dexAMETHasone     Tablet 6 milliGRAM(s) Oral daily  dextrose 40% Gel 15 Gram(s) Oral once  dextrose 5%. 1000 milliLiter(s) (50 mL/Hr) IV Continuous <Continuous>  dextrose 5%. 1000 milliLiter(s) (100 mL/Hr) IV Continuous <Continuous>  dextrose 50% Injectable 25 Gram(s) IV Push once  dextrose 50% Injectable 12.5 Gram(s) IV Push once  dextrose 50% Injectable 25 Gram(s) IV Push once  enoxaparin Injectable 40 milliGRAM(s) SubCutaneous every 12 hours  fentaNYL   Patch 100 MICROgram(s)/Hr 1 Patch Transdermal every 72 hours  glucagon  Injectable 1 milliGRAM(s) IntraMuscular once  influenza   Vaccine 0.5 milliLiter(s) IntraMuscular once  insulin lispro (ADMELOG) corrective regimen sliding scale   SubCutaneous Before meals and at bedtime  levETIRAcetam 500 milliGRAM(s) Oral every 12 hours  pantoprazole    Tablet 40 milliGRAM(s) Oral before breakfast  polyethylene glycol 3350 17 Gram(s) Oral daily  senna 2 Tablet(s) Oral at bedtime    MEDICATIONS  (PRN):  HYDROmorphone  Injectable 0.5 milliGRAM(s) IV Push every 4 hours PRN Breakthrough pain  ondansetron Injectable 4 milliGRAM(s) IV Push every 6 hours PRN Nausea and/or Vomiting  oxycodone    5 mG/acetaminophen 325 mG 1 Tablet(s) Oral every 4 hours PRN Severe Pain (7 - 10)      No Known Allergies      LABS                        14.1   11.15 )-----------( 300      ( 2022 11:37 )             42.3         138  |  103  |  12  ----------------------------<  151<H>  4.5   |  22  |  0.51    Ca    9.6      2022 11:37    TPro  7.4  /  Alb  3.9  /  TBili  0.3  /  DBili  x   /  AST  30  /  ALT  29  /  AlkPhos  59      PT/INR - ( 2022 11:37 )   PT: 13.6 sec;   INR: 1.14          PTT - ( 2022 11:37 )  PTT:29.8 sec  Urinalysis Basic - ( 2022 13:47 )    Color: Yellow / Appearance: Clear / S.020 / pH: x  Gluc: x / Ketone: Trace mg/dL  / Bili: Negative / Urobili: 0.2 E.U./dL   Blood: x / Protein: 100 mg/dL / Nitrite: NEGATIVE   Leuk Esterase: Trace / RBC: < 5 /HPF / WBC < 5 /HPF   Sq Epi: x / Non Sq Epi: 0-5 /HPF / Bacteria: Present /HPF            RADIOLOGY & ADDITIONAL TESTS: Reviewed

## 2022-03-01 NOTE — CONSULT NOTE ADULT - SUBJECTIVE AND OBJECTIVE BOX
66 y.o woman with COPD on home O2 found to have esSCLC with mediastinal, CNS, osseous metastases.  Diagnosed at [facility] 10/2021, started on carboplatin/etoposide/durvalumab with Xgenva 11/2021, delays to completion due to hospitalization for PNA 12/2021.  Radiation consultation was held for CNS Dz 11/19/2021 but patient declined at that time.  Following completion of chemotherapy and immunotherapy 2/7/2022, imaging showed progression of disease second line paclitaxel was initiated.  Patient has only received one cycle of this prior to functional decline.  She has pain in neck related to osseous metastases, headaches, cough.  Palliative care (Dr. Pacheco) has been following as well as an outpatient.    Due to confusion, further fatigue, and inability to cope at home, patient was admitted yesterday.  CT head w vasogenic edema.  Pt placed on decadron, with improvement in mentation; no headaches currently.  She has had meetings re: GOC.  Currently without pain, lying comfortably in bed.    PAST MEDICAL & SURGICAL HISTORY:  Hypertension  Stage 4 lung cancer  Chronic obstructive pulmonary disease (COPD)  Emphysema of lung    MEDICATIONS  (STANDING):  albuterol/ipratropium for Nebulization 3 milliLiter(s) Nebulizer every 6 hours  dexAMETHasone     Tablet 6 milliGRAM(s) Oral daily  dextrose 40% Gel 15 Gram(s) Oral once  dextrose 5%. 1000 milliLiter(s) IV Continuous <Continuous>  dextrose 5%. 1000 milliLiter(s) IV Continuous <Continuous>  dextrose 50% Injectable 25 Gram(s) IV Push once  dextrose 50% Injectable 12.5 Gram(s) IV Push once  dextrose 50% Injectable 25 Gram(s) IV Push once  enoxaparin Injectable 40 milliGRAM(s) SubCutaneous every 12 hours  fentaNYL   Patch 100 MICROgram(s)/Hr 1 Patch Transdermal every 72 hours  glucagon  Injectable 1 milliGRAM(s) IntraMuscular once  influenza   Vaccine 0.5 milliLiter(s) IntraMuscular once  insulin lispro (ADMELOG) corrective regimen sliding scale   SubCutaneous Before meals and at bedtime  levETIRAcetam 500 milliGRAM(s) Oral every 12 hours  pantoprazole    Tablet 40 milliGRAM(s) Oral before breakfast  polyethylene glycol 3350 17 Gram(s) Oral daily  senna 2 Tablet(s) Oral at bedtime      Allergies:  No Known Allergies    FAMILY HISTORY:  NC    ROS:  The patient denies chest pain.  No nausea/vomiting/fevers/chills/night sweats.    +fatigue, denies headaches/dizziness.    Appetite is stable  No abdominal pain/diarrhea/constipation.  No melena or hematochezia.    No dysuria/hematuria.  No history of easy bruising/bleeding.  No gingival bleeding or epistaxis.  No leg pain or leg swelling.  ROS is otherwise negative.    Physical exam:    Vital signs  Vital Signs Last 24 Hrs  T(C): 37.3 (03-01-22 @ 05:21), Max: 37.3 (02-28-22 @ 10:45)  T(F): 99.1 (03-01-22 @ 05:21), Max: 99.1 (02-28-22 @ 10:45)  HR: 108 (03-01-22 @ 05:21) (106 - 117)  BP: 137/84 (03-01-22 @ 05:21) (137/84 - 167/88)  BP(mean): --  RR: 20 (03-01-22 @ 05:21) (20 - 22)  SpO2: 94% (03-01-22 @ 05:21) (91% - 98%)  HEENT: PERRLA, pink mucosae  Cardiovascular: no murmurs, rubs, gallops  Respiratory: clear to auscultation B/L  Abdomen: soft, non tender, non distended, no palpable masses,  Extremities:  anasarca  Neuro: alert, awake and oriented x 3, no focal abnormalities  Skin: warm, no obvious lesions on visible skin

## 2022-03-01 NOTE — CONSULT NOTE ADULT - PROBLEM SELECTOR RECOMMENDATION 9
.  chronically prescribed Fent and oxy IR 10mg-20mg by outpatient Pall Provider, Dr. Pacheco. HA 2/2 brain mets, edema. given opioid needs, percocet is subtherapeutic for this patient  - dc percocet   - cw home Fentanyl 100 mcg/hr q72  - cw dex 6 PO qD  - start oxy 10 mg PO q4 PRN mild pain  - start oxy 20 mg PO q4 PRN moderate pain   - start dilaudid 1 mg IV q4 PRN severe pain .  chronically prescribed Fent and oxy IR 10mg-20mg by outpatient Pall Provider, Dr. Pacheco for right sided back, rib pain 2/2 tumor burden. prior to admission with HA likely 2/2 brain mets, edema. has resolved since starting steroids this admission. given opioid needs, percocet is subtherapeutic for this patient  - dc percocet   - cw home Fentanyl 100 mcg/hr q72  - cw dex 6 PO qD  - start oxy 10 mg PO q4 PRN mild pain  - start oxy 20 mg PO q4 PRN moderate pain   - start dilaudid 1 mg IV q4 PRN severe pain/pain crisis .  chronically prescribed Fent and oxy IR 10mg-20mg by outpatient Pall Provider, Dr. Pacheco for right sided back, rib pain 2/2 tumor burden. prior to admission with HA likely 2/2 brain mets, edema. HA resolved since starting steroids this admission. given opioid needs, percocet is subtherapeutic for this patient  - dc percocet   - cw home Fentanyl 100 mcg/hr q72  - cw dex 6 PO qD  - start oxy 10 mg PO q4 PRN mild pain  - start oxy 20 mg PO q4 PRN moderate pain   - start dilaudid 1 mg IV q4 PRN severe pain/pain crisis  - consider Rad onc consult for palliation of symptom burden

## 2022-03-01 NOTE — CONSULT NOTE ADULT - PROBLEM SELECTOR RECOMMENDATION 3
.  LBM 2/26   - senna 2 tabs PO qHS  - mLax qAM scheduled .  LBM 2/26   - senna 2 tabs PO qHS  - mLax qAM scheduled  - goal BM qD or every other day

## 2022-03-01 NOTE — PATIENT PROFILE ADULT - FALL HARM RISK - HARM RISK INTERVENTIONS

## 2022-03-01 NOTE — CONSULT NOTE ADULT - SUBJECTIVE AND OBJECTIVE BOX
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 Patient started getting SOB last summer and it continue to progress. Patient eventually got CT scan performed 10/21 which was concerning for lung Ca. Patient had biopsy performed and was diagnosed with Stage IV squamos cell cancer. She was then referred to Dr. Costa who immediately started chemotherapy (she got chemo 3 x week then would have a 2 week off period. did this for 9 cycles roughly 3 months). Dr. Costa performed pet scan that showed no improvement and then offered patient alternative tx 2 weeks ago. However, patient immediately felt side effects and could not tolerate the chemo. She decided she no longer wants to pursue anymore aggresive therapy or interventions. Today daughter states she was going to take patient to another Oncologist for last resort second opinion. However, patient became to weak and could not even move which prompted them to come to the ED. Patient states she has been fatigued since last friday, has had dec PO intake, felt intermittent palpitations yesterday, intermittent nausea. States her SOB is at baseline and she has a nonproductive cough. Denies fever chills, vomiting, diarrhea, dysuria, frequency, urgency, cough. Daughter states that since intial diagnosis patient has stopped taking all her medications except pain medications (fentanyl 100 mc patch inc from 75 mcg this past weekend by outpatient palliative, and oxy 15 mg IR). Patient currently denying pain.     ED Course: 99.1 F, 117 HR, 149/103 BP,20 RR, 98% on 2 l  WBC 11.15, BMP unremarkable, glucose 151  ua (-)  CT chest-No significant interval change in bulky mediastinal and hilar lymphadenopathy with necrotic 5.2 cm right paratracheal lymph node and conglomerate mass/lymphadenopathy within the subcarinal region measuring 7.5 x 7.9 cm with narrowing and encasement of the bronchus intermedius and obstruction of both the right middle and right lower lobe ronchi.Postobstructive pneumonitis involving the right lower lobe with encasement and obstruction of the bronchus intermedius, right middle and right lower lobe bronchi by confluent lymphadenopathy/mediastinal mass as above. Findings compatible with provided history of advanced stage small cell carcinoma.  CT head-Extensive areas of vasogenic edema including in the right frontal lobe, left paramedian parietal lobe, and bilateral cerebellar hemispheres, findings concerning for intracranial metastatic disease in the setting of known malignancy. Dedicated MRI brain with and without contrast is recommended for further assessment.    Prior medications:amLODIPine Besylate 5 MG, ARIPiprazole 2 MG Oral Tablet,Atorvastatin Calcium 20 MG, BuPROPion HCl ER (SR) 200 MG   FLUoxetine HCl - 40 MG Oral Capsule  Allergies-none  Social hx- smoked 1.5 ppd (until Ca diagnosis then smoked 3-4 cigs until 2 weeks ago), denies drinking/drug use, ambulates with walker, lives with daughter Eve ([phone]) (2022 23:29)      PAST MEDICAL & SURGICAL HISTORY:  Hypertension    Stage 4 lung cancer    Chronic obstructive pulmonary disease (COPD)    Emphysema of lung    No significant past surgical history        MEDICATIONS  (STANDING):  albuterol/ipratropium for Nebulization 3 milliLiter(s) Nebulizer every 6 hours  dexAMETHasone     Tablet 6 milliGRAM(s) Oral daily  dextrose 40% Gel 15 Gram(s) Oral once  dextrose 5%. 1000 milliLiter(s) (50 mL/Hr) IV Continuous <Continuous>  dextrose 5%. 1000 milliLiter(s) (100 mL/Hr) IV Continuous <Continuous>  dextrose 50% Injectable 25 Gram(s) IV Push once  dextrose 50% Injectable 12.5 Gram(s) IV Push once  dextrose 50% Injectable 25 Gram(s) IV Push once  enoxaparin Injectable 40 milliGRAM(s) SubCutaneous every 12 hours  fentaNYL   Patch 100 MICROgram(s)/Hr 1 Patch Transdermal every 72 hours  glucagon  Injectable 1 milliGRAM(s) IntraMuscular once  influenza   Vaccine 0.5 milliLiter(s) IntraMuscular once  insulin lispro (ADMELOG) corrective regimen sliding scale   SubCutaneous Before meals and at bedtime  levETIRAcetam 500 milliGRAM(s) Oral every 12 hours  pantoprazole    Tablet 40 milliGRAM(s) Oral before breakfast  polyethylene glycol 3350 17 Gram(s) Oral daily  senna 2 Tablet(s) Oral at bedtime    MEDICATIONS  (PRN):  HYDROmorphone  Injectable 1 milliGRAM(s) IV Push every 4 hours PRN Severe Pain (7 - 10)  ondansetron Injectable 4 milliGRAM(s) IV Push every 6 hours PRN Nausea and/or Vomiting  oxyCODONE    IR 10 milliGRAM(s) Oral every 4 hours PRN Mild Pain (1 - 3)  oxyCODONE    IR 20 milliGRAM(s) Oral every 4 hours PRN Moderate Pain (4 - 6)      FAMILY HISTORY:  FH: cancer (Father)        CBC Full  -  ( 2022 11:37 )  WBC Count : 11.15 K/uL  RBC Count : 4.71 M/uL  Hemoglobin : 14.1 g/dL  Hematocrit : 42.3 %  Platelet Count - Automated : 300 K/uL  Mean Cell Volume : 89.8 fl  Mean Cell Hemoglobin : 29.9 pg  Mean Cell Hemoglobin Concentration : 33.3 gm/dL  Auto Neutrophil # : 8.43 K/uL  Auto Lymphocyte # : 1.86 K/uL  Auto Monocyte # : 0.78 K/uL  Auto Eosinophil # : 0.01 K/uL  Auto Basophil # : 0.02 K/uL  Auto Neutrophil % : 75.6 %  Auto Lymphocyte % : 16.7 %  Auto Monocyte % : 7.0 %  Auto Eosinophil % : 0.1 %  Auto Basophil % : 0.2 %          138  |  103  |  12  ----------------------------<  151<H>  4.5   |  22  |  0.51    Ca    9.6      2022 11:37    TPro  7.4  /  Alb  3.9  /  TBili  0.3  /  DBili  x   /  AST  30  /  ALT  29  /  AlkPhos  59        Urinalysis Basic - ( 2022 13:47 )    Color: Yellow / Appearance: Clear / S.020 / pH: x  Gluc: x / Ketone: Trace mg/dL  / Bili: Negative / Urobili: 0.2 E.U./dL   Blood: x / Protein: 100 mg/dL / Nitrite: NEGATIVE   Leuk Esterase: Trace / RBC: < 5 /HPF / WBC < 5 /HPF   Sq Epi: x / Non Sq Epi: 0-5 /HPF / Bacteria: Present /HPF          Radiology:    < from: Xray Chest 1 View AP/PA (22 @ 12:35) >  ACC: [id number] EXAM:  XR CHEST AP OR PA 1V                          PROCEDURE DATE:  2022          INTERPRETATION:  TECHNIQUE: Single portable view of the chest.    COMPARISON:  2022    CLINICAL HISTORY: ams    FINDINGS:    Single frontalview of the chest demonstrates large right lower lobe   consolidation/mass.. The cardiomediastinal silhouette is enlarged.   Right-sided MediPort catheter tip in the right atrium. No acute osseous   abnormalities. . Please refer to the subsequent chest CT for further   details.    IMPRESSION: Large right lower lobe consolidation/mass. Cardiomegaly.        < from: CT Head No Cont (22 @ 13:48) >  ACC: [id number] EXAM:  CT BRAIN                          PROCEDURE DATE:  2022          INTERPRETATION:  CLINICAL INDICATION: Confusion. History of lung cancer   with brain metastases.    TECHNIQUE: CT of the head was performed without the administration of   intravenous contrast.    COMPARISON:   None available.    FINDINGS:    There are extensive areas of vasogenic edema including in the right   frontal lobe, left paramedian parietal lobe, and bilateral cerebellar   hemispheres, findings concerning for intracranial metastatic disease in   the setting of known malignancy. Note, there are subcentimeter hyperdense   lesions in the right paramedian frontal lobe (series 2 image 24) and in   the left posterior frontal lobe (series 5 image 29), without definite   surrounding vasogenic edema, which may represent treated metastases.    There is no evidence of associated midline shift, herniation, or   obstructive hydrocephalus.    There is no evidence of acute infarction or intracranial hemorrhage.    There are no extra-axial fluid collections.    There is hypoattenuation of the subcortical and periventricular white   matter, which is nonspecific finding, but most likely represents sequela   of chronic microvascular ischemic disease. There is prominence of the   cortical sulci related to underlying brain parenchymal volume loss.    The visualized intraorbital contents are normal. The imaged portions of   the paranasal sinuses are clear. The mastoid air cells are clear. The   visualized soft tissues and osseous structures appear unremarkable.    IMPRESSION:    Extensive areas of vasogenic edema including in the right frontal lobe,   left paramedian parietal lobe, and bilateral cerebellar hemispheres,   findings concerning for intracranial metastatic disease in the setting of   known malignancy. Dedicated MRI brain with and without contrast is   recommended for further assessment.                Vital Signs Last 24 Hrs  T(C): 37.3 (01 Mar 2022 05:21), Max: 37.3 (01 Mar 2022 05:21)  T(F): 99.1 (01 Mar 2022 05:21), Max: 99.1 (01 Mar 2022 05:21)  HR: 108 (01 Mar 2022 05:21) (106 - 115)  BP: 137/84 (01 Mar 2022 05:21) (137/84 - 167/88)  BP(mean): --  RR: 20 (01 Mar 2022 05:21) (20 - 22)  SpO2: 94% (01 Mar 2022 05:21) (91% - 94%)         REVIEW OF SYSTEMS:  per HPI         Physical Exam:  obese 65 yo woman lying insemi Connell's position, awake, alert, no acute complaints    Neurologic Exam:    Alert and oriented  x 3      Motor Exam:    Right UE:             no focal weakness, > 3+/5 throghout    Left UE:               no focal weakness, > 3+/5 throghout      Right LE:               no focal weakness, > 3+/5 throghout    Left LE:               no focal weakness, > 3+/5 throghout               Sensation:           intact to light touch x 4 extremities                                              DTR:                  biceps/brachioradialis: equal                                                 patella/ankle: equal                             Gait:  not tested              PM&R Impression:    1) St 4 lung ca/ FTT    Recommendations/ Plan :    1) Physical / Occupational therapy focusing on therapeutic exercises, bed mobility/transfer out of bed evaluation, progressive ambulation with assistive devices prn.    2) Anticipated Disposition Plan/Recs:   pending functional progress

## 2022-03-01 NOTE — CONSULT NOTE ADULT - ASSESSMENT
65 y/o F w/ Hx COPD, emphysema on home O2, SCC lung CA w/ mets to brain, bone, lymph nodes with disease progression through first and second line tx who presented 2/28 with FTT. No further DMT offered. Palliative care consulted for GOC and symptom management.   67 y/o F w/ Hx COPD, emphysema on home O2, SCC lung CA w/ mets to brain, bone, lymph nodes with disease progression through first and second line tx who presented 2/28 with FTT. Palliative care consulted for GOC and symptom management.

## 2022-03-01 NOTE — CONSULT NOTE ADULT - ASSESSMENT
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 Patient admitted for further management and palliative assessment.    Problem/Plan - 1:  ·  Problem: Stage 4 lung cancer.   ·  Plan: Patient diagnosed last Oct with Stage IV squamous cell carcinoma s/p 3 months of chemo (with Dr. Costa) per daughter with outpatient Pet SCan showing no improvement. Trialed on alternative regimen two weeks ago but could not tolerate. Patient with symptoms consistent with failure to thrive, and no longer wants any aggressive measures. CT scan this admission showing No significant interval change in bulky mediastinal and hilar lymphadenopathy with necrotic 5.2 cm right paratracheal lymph node and conglomerate mass/lymphadenopathy within the subcarinal region measuring 7.5 x 7.9 cm with narrowing and encasement of the bronchus intermedius and obstruction of both the right middle and right lower lobe ronchi. CT head-Extensive areas of vasogenic edema including in the right frontal lobe, left paramedian parietal lobe, and bilateral cerebellar hemispheres, findings concerning for intracranial metastatic disease in the setting of known malignancy.  - GOC - DNR/DNI, official comfort care pending pallcare discussion  - c/w keppra 500 mg BID and decadron for seizure prophylaxia given brain mets  - percocet for severe pain, breakthrough dilaudad ordered  - zofran prn for nausea  - f/u palliative recs.    Problem/Plan - 2:  ·  Problem: Failure to thrive in adult.   ·  Plan: as above  -PT consult    #Postobstructive pneumonitis   CT chest showing the right lower lobe with encasement and obstruction of the bronchus intermedius, right middle and right lower lobe bronchi by confluent lymphadenopathy/mediastinal mass as above. Findings compatible with provided history of advanced stage small cell carcinoma. Patient with 1/4 SIRs on arrival. No concerns for overt infection. Immune response may be masked in setting of chemotherapy. However, patient with no fever, chills, worsened SOB, productive cough, or UTI like symtoms that would concern for infection.  -continue to monitor low threshold to start Abx if patient spikes temp.    Problem/Plan - 3:  ·  Problem: Vasogenic brain edema.   ·  Plan: see above  - c/w keppra and decadron for seizure prophylaxis.    Problem/Plan - 4:  ·  Problem: Emphysema of lung.   ·  Plan: Patient with extensive smoking history. Per daughter she is on 4-5 L at home. However, satting 96% on 2 L here. No inc in sputum or cough. Not in COPD exacerbation  - goal 02 sat >88%  - c/w standing duonebs.    Problem/Plan - 5:  ·  Problem: Hypertension.   ·  Plan: Patient with hx of being norvasc has stopped all medications since october. SBP in 130s-140s.   - continue to monitor pressures  - if hypertensive, can consider restarting norvasc 5 mg.    Problem/Plan - 6:  ·  Problem: HLD (hyperlipidemia).   ·  Plan: Patient does not want pill burden  - hold lipitor 20 mg, possible comfort care pending pallcare discussion.    Problem/Plan - 7:  ·  Problem: Depression.   ·  Plan: hx of wellbutrin and lexapro. avoiding pill burden.    Problem/Plan - 8:  ·  Problem: Nutrition, metabolism, and development symptoms.   ·  Plan: Regular diet  replete lytes as needed  lovenox  no GI PPx  DNR DNI

## 2022-03-01 NOTE — CONSULT NOTE ADULT - ASSESSMENT
ASSESSMENT:  66F with COPD and extensive stage small cell lung CA, involving brain and bone presenting with failure to thrive, with no further systemic treatment options, with comfort care preference.    Plan:  We had a thorough conversation regarding different intracranial-directed radiation options, as well as goals of care.  Given her performance status and prognosis, with plan for hospice per patient, we discussed that brain-directed RT benefit might prove limited.  The [facility] study that compared dex and supportive care with or without WBRT in tx'ing pts with nsclc w brain mets unsuitable for surgery or srs demonstrated WBRT provided little additional clinically significant benefit for pts with poor PS in this grouping (Lancet. 2016;388(68865):2004. Epub 2016 Sep 4.).  She has esSCLC with POD on systemic agents.  We did offer for MRI imaging to better assess her intracranial dz; she notes this had been performed at an OSH and does not wish to pursue palliative RT to the brain given prognosis and potential for symptoms with treatment.  We will remain available should she change her mind and agree with continued palliative care for this very sweet patient with unfortunately incurable disease.

## 2022-03-01 NOTE — CONSULT NOTE ADULT - SUBJECTIVE AND OBJECTIVE BOX
EVAN MONTOYA  MRN-[id number]      HPI:    66 y.o woman with COPD on home O2 found to have esSCLC with mediastinal, CNS, osseous metastases.  Diagnosed at [facility] [place] 10/2021, started on carboplatin/etoposide/durvalumab with Xgenva 11/2021, delays to completion due to hospitalization for PNA 12/2021.  Radiation consulation was held for CNS Dz 11/19/2021 but patient declined at that time.      Following completion of chemotherapy and immunotherapy 2/7/2022, imaging showed progression of disease second line paclitaxel was initiated.  Patient has only received one cycle of this prior to functional decline.  She has pain in neck related to osseous metastases, headaches, cough.  Palliative care (Dr. Pacheco) has been following as well as an outpatient.    Due to confusion, further fatigue, and inability to cope at home, patient was admitted yesterday.    Today her primary complaints are pain in neck and headache. She denies other pain, nausea, vomiting, poor appetite      PAST MEDICAL & SURGICAL HISTORY:  Hypertension    Stage 4 lung cancer    Chronic obstructive pulmonary disease (COPD)    Emphysema of lung        MEDICATIONS  (STANDING):  albuterol/ipratropium for Nebulization 3 milliLiter(s) Nebulizer every 6 hours  dexAMETHasone     Tablet 6 milliGRAM(s) Oral daily  dextrose 40% Gel 15 Gram(s) Oral once  dextrose 5%. 1000 milliLiter(s) IV Continuous <Continuous>  dextrose 5%. 1000 milliLiter(s) IV Continuous <Continuous>  dextrose 50% Injectable 25 Gram(s) IV Push once  dextrose 50% Injectable 12.5 Gram(s) IV Push once  dextrose 50% Injectable 25 Gram(s) IV Push once  enoxaparin Injectable 40 milliGRAM(s) SubCutaneous every 12 hours  fentaNYL   Patch 100 MICROgram(s)/Hr 1 Patch Transdermal every 72 hours  glucagon  Injectable 1 milliGRAM(s) IntraMuscular once  influenza   Vaccine 0.5 milliLiter(s) IntraMuscular once  insulin lispro (ADMELOG) corrective regimen sliding scale   SubCutaneous Before meals and at bedtime  levETIRAcetam 500 milliGRAM(s) Oral every 12 hours  pantoprazole    Tablet 40 milliGRAM(s) Oral before breakfast  polyethylene glycol 3350 17 Gram(s) Oral daily  senna 2 Tablet(s) Oral at bedtime      Allergies    No Known Allergies    Intolerances          FAMILY HISTORY:      ROS:  The patient denies chest pain.  No nausea/vomiting/fevers/chills/night sweats.    +fatigue, +headaches/dizziness.    Appetite is stable  No abdominal pain/diarrhea/constipation.  No melena or hematochezia.    No dysuria/hematuria.  No history of easy bruising/bleeding.  No gingival bleeding or epistaxis.  No leg pain or leg swelling.    ROS is otherwise negative.    Physical exam:    Vital signs  Vital Signs Last 24 Hrs  T(C): 37.3 (03-01-22 @ 05:21), Max: 37.3 (02-28-22 @ 10:45)  T(F): 99.1 (03-01-22 @ 05:21), Max: 99.1 (02-28-22 @ 10:45)  HR: 108 (03-01-22 @ 05:21) (106 - 117)  BP: 137/84 (03-01-22 @ 05:21) (137/84 - 167/88)  BP(mean): --  RR: 20 (03-01-22 @ 05:21) (20 - 22)  SpO2: 94% (03-01-22 @ 05:21) (91% - 98%)  HEENT: PERRLA, pink mucosae  Cardiovascular: no murmurs, rubs, gallops  Respiratory: clear to asucultation B/L  Abdomen: soft, non tender, non distended, no palpable masses,  Extremities:  anasarca  Neuro: alert, awake and oriented x 3, no focal abnormalities  Skin: warm, no obvious lesions on visible skin    LABS:  CBC Full  -  ( 28 Feb 2022 11:37 )  WBC Count : 11.15 K/uL  Hemoglobin : 14.1 g/dL  Hematocrit : 42.3 %  Platelet Count - Automated : 300 K/uL  Mean Cell Volume : 89.8 fl  Mean Cell Hemoglobin : 29.9 pg  Mean Cell Hemoglobin Concentration : 33.3 gm/dL  Auto Neutrophil # : 8.43 K/uL  Auto Lymphocyte # : 1.86 K/uL  Auto Monocyte # : 0.78 K/uL  Auto Eosinophil # : 0.01 K/uL  Auto Basophil # : 0.02 K/uL  Auto Neutrophil % : 75.6 %  Auto Lymphocyte % : 16.7 %  Auto Monocyte % : 7.0 %  Auto Eosinophil % : 0.1 %  Auto Basophil % : 0.2 %    28 Feb 2022 11:37    138    |  103    |  12     ----------------------------<  151    4.5     |  22     |  0.51     Ca    9.6        28 Feb 2022 11:37    TPro  7.4    /  Alb  3.9    /  TBili  0.3    /  DBili  x      /  AST  30     /  ALT  29     /  AlkPhos  59     28 Feb 2022 11:37      PERTINENT IMAGING STUDIES: reviewed    ASSESSMENT:  66F with COPD and extensive stage small cell lung CA, involving brain and bone presenting with failure to thrive    PLAN    SCLC  This is an aggressive malignancy that is typically characterized by an early response to chemotherapy followed by a treatment-resistant relapse  In the case of this patient, initial response was not obtained with optimal therapy  When this is seen, prognosis is grim, estimated in weeks to months  Benefit of further systemic therapy is minimal at best  Patient is not a candidate for further chemotherapy  Suggest supportive care to optimize pain control with limitation on sedation  Agree with palliative care assessment  Patient has concerns about discharge to home as she lives with a 4 year old granddaugther and has concerns about her daughter seeing her this ill    Medical Oncology to remain available during this admission      Thank you    Ruel Burns MD for Vimal Tolliver MD  [phone]

## 2022-03-01 NOTE — CONSULT NOTE ADULT - PROBLEM SELECTOR RECOMMENDATION 2
.  electrolytes okay, etiologies likely 2/2 chemo and CNS brain mets, edema.  - cw dex as above  - cw zofran 6 mg IVP PRN breakthrough nausea .  electrolytes wnl. etiologies likely 2/2 chemo induced and CNS brain mets, edema.  - cw dex as above  - cw zofran 6 mg IVP PRN breakthrough nausea

## 2022-03-01 NOTE — CONSULT NOTE ADULT - CONVERSATION DETAILS
Pts chart reviewed. Discussion at bedside with palliative team, pt and her daughter. Pt follows with outpatient Palliative MD and understating of Palliative Care's role in ACP, symptom management, and emotional support. They have a clear understanding that all DMT is palliative not curative in intent and that patient's overall prognosis is limited to weeks/months.     Reviewed recent developments and medical updates. Pt with bulky mediastinal and hilar lymphadenopathy, necrotic R paratracheal LN, and conglomerate mass/lymphadenopathy within the subcarinal region with encasement of the bronchus and obstruction of both the right middle and right lower lobe ronchi. Also with extensive areas of vasogenic edema.     Previously pt had declined brain XRT because of fear that she would need a Gtube (not within GOC) and also had increased anxiety/dyspnea when lying flat for SIM. They want to speak with oncologist Dr. Costa to see if palliative brain XRT is still an option for patient. Pt amenable to trying XRT if she is premedicated prior to.      They also have a follow up appointment with [facility] next week to explore further DMT. Pts chart reviewed. Discussion at bedside with palliative team, pt and her daughter. Pt follows with outpatient Palliative MD and understating of Palliative Care's role in ACP, symptom management, and emotional support. They have a clear understanding that all DMT is palliative not curative in intent and that patient's overall prognosis is limited to weeks/months.     Reviewed recent developments and medical updates. Pt with bulky mediastinal and hilar lymphadenopathy, necrotic R paratracheal LN, and mass/lymphadenopathy within the subcarinal region w encasement of bronchus and obstruction of both the right middle and right lower lobe. Also with extensive areas of vasogenic edema.     Previously pt had declined brain XRT because of fear that she would need a Gtube (not within GOC) and also had increased anxiety/dyspnea when lying flat for SIM. They want to speak with oncologist Dr. Costa to see if palliative brain XRT is still an option for patient. Pt amenable to trying XRT if she is premedicated prior to.      They also have a follow up appointment with [facility] next week to explore further DMT.    We discussed symptom directed care and hospice. Pt and family do not want home hospice as pt doesn't want her granddaughter to witness her at EOL. Per palliative SW Raudel Pinto, pts insurance would allow EOL care at [facility]. Family understanding that they can transition to care with exclusive focus on symptom management at anytime.     Would like additional home health resources if available. Pall SW to discuss with Unit SW.    All questions answered. Emotional support provided.     Total time: 60 minutes

## 2022-03-01 NOTE — CONSULT NOTE ADULT - PROBLEM SELECTOR RECOMMENDATION 6
.  Neoplasm related pain controlled. Would recommend palliative XRT to minimize symptom burden/pain. GOC ongoing. Family coping well. Will continue to follow and assist with GOC as hospital course progresses.     Active Psychosocial Referrals:[ ]SW/CM [ ]PT/OT [ ]Chaplaincy [ ]Hospice[x ] [facility] Patient/Family Support [ ]Holistic RN [ ]Massage Therapy [ ]Ethics  Coping: [ x] well [ ] with difficulty [ ] poor coping [ ] unable to assess  Support system: [ ] strong [x ] adequate [ ] inadequate    - All questions answered, emotional support provided  -  primary team   - For new or uncontrolled symptoms, please call Palliative Care at [phone]. The service is available 24/7 (including nights & weekends) to provide symptom management recommendations over the phone as appropriate  - Will continue to follow with you

## 2022-03-01 NOTE — CONSULT NOTE ADULT - SUBJECTIVE AND OBJECTIVE BOX
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     Patient started getting SOB last summer and it continue to progress. Patient eventually got CT scan performed 10/21 which was concerning for lung Ca. Patient had biopsy performed and was diagnosed with Stage IV squamos cell cancer. She was then referred to Dr. Costa who immediately started chemotherapy (she got chemo 3 x week then would have a 2 week off period. did this for 9 cycles roughly 3 months). Dr. Costa performed pet scan that showed no improvement and then offered patient alternative tx 2 weeks ago. However, patient immediately felt side effects and could not tolerate the chemo. She decided she no longer wants to pursue anymore aggresive therapy or interventions. Today daughter states she was going to take patient to another Oncologist for last resort second opinion. However, patient became to weak and could not even move which prompted them to come to the ED. Patient states she has been fatigued since last friday, has had dec PO intake, felt intermittent palpitations yesterday, intermittent nausea. States her SOB is at baseline and she has a nonproductive cough. Denies fever chills, vomiting, diarrhea, dysuria, frequency, urgency, cough. Daughter states that since intial diagnosis patient has stopped taking all her medications except pain medications (fentanyl 100 mc patch inc from 75 mcg this past weekend by outpatient palliative, and oxy 15 mg IR). Patient currently denying pain.     ED Course: 99.1 F, 117 HR, 149/103 BP,20 RR, 98% on 2 l  WBC 11.15, BMP unremarkable, glucose 151  ua (-)  CT chest-No significant interval change in bulky mediastinal and hilar lymphadenopathy with necrotic 5.2 cm right paratracheal lymph node and conglomerate mass/lymphadenopathy within the subcarinal region measuring 7.5 x 7.9 cm with narrowing and encasement of the bronchus intermedius and obstruction of both the right middle and right lower lobe ronchi.Postobstructive pneumonitis involving the right lower lobe with encasement and obstruction of the bronchus intermedius, right middle and right lower lobe bronchi by confluent lymphadenopathy/mediastinal mass as above. Findings compatible with provided history of advanced stage small cell carcinoma.  CT head-Extensive areas of vasogenic edema including in the right frontal lobe, left paramedian parietal lobe, and bilateral cerebellar hemispheres, findings concerning for intracranial metastatic disease in the setting of known malignancy. Dedicated MRI brain with and without contrast is recommended for further assessment.    Prior medications:amLODIPine Besylate 5 MG, ARIPiprazole 2 MG Oral Tablet,Atorvastatin Calcium 20 MG, BuPROPion HCl ER (SR) 200 MG   FLUoxetine HCl - 40 MG Oral Capsule  Allergies-none  Social hx- smoked 1.5 ppd (until Ca diagnosis then smoked 3-4 cigs until 2 weeks ago), denies drinking/drug use, ambulates with walker, lives with daughter Eve ([phone]) (2022 23:29)      PRESENT SYMPTOMS:   [ ]Unable to obtain due to poor mentation   Source if other than patient:  [ ]Family   [ ]Team     Pain:  Location :        Quality:  Radiation:  Timing:  Aggravating factors:  Minimal acceptable level (0-10 scale):   Severity in last 24h (0-10 scale) :  Current score (0-10 scale):  Improves with:     PAIN AD Score:   [email or web address] (press ctrl +  left click to view)    If [ ], pt denies symptom.   Dyspnea:         [ ]Mild [ ]Moderate [ ]Severe  Anxiety:           [ ]Mild [ ]Moderate [ ]Severe  Fatigue:           [ ]Mild [ ]Moderate [ ]Severe  Nausea:           [ ]Mild [ ]Moderate [ ]Severe  Loss of appetite:     [ ]Mild [ ]Moderate [ ]Severe  Constipation:   [ ]Mild [ ]Moderate [ ]Severe  LBM   Grief Present   [ ] Yes   [ ] No   Other Symptoms:    [ ]All other review of systems negative     Opiate Naive (Y/N): No   iStop reviewed (Y/N): Yes.   Fentanyl, oxycodone IR Rx found on iStop review (Ref#:  [id number]         PAST MEDICAL & SURGICAL HISTORY:  Hypertension    Stage 4 lung cancer  Chronic obstructive pulmonary disease (COPD)  Emphysema of lung  No significant past surgical history    FAMILY HISTORY:  FH: cancer (Father)     Reviewed and found non contributory in mother or father    SOCIAL HISTORY:   - Support system: [ ] strong [ ] adequate [ ] inadequate    PSYCHOSOCIAL ASSESSMENT:  - Jain/Spiritual practice:  - Role of organized Congregational [ ] important [ ] some [ ] unable to assess dt pt mentation     - Coping: [ ] well [ ] with difficulty [ ] poor coping [ ] unable to assess dt pt mentation  - What is most important to patient?  - What worries patient the most?  - Is patient at peace? :     Allergies    No Known Allergies    Intolerances        Medications:      MEDICATIONS  (STANDING):  albuterol/ipratropium for Nebulization 3 milliLiter(s) Nebulizer every 6 hours  dexAMETHasone     Tablet 6 milliGRAM(s) Oral daily  dextrose 40% Gel 15 Gram(s) Oral once  dextrose 5%. 1000 milliLiter(s) (50 mL/Hr) IV Continuous <Continuous>  dextrose 5%. 1000 milliLiter(s) (100 mL/Hr) IV Continuous <Continuous>  dextrose 50% Injectable 25 Gram(s) IV Push once  dextrose 50% Injectable 12.5 Gram(s) IV Push once  dextrose 50% Injectable 25 Gram(s) IV Push once  enoxaparin Injectable 40 milliGRAM(s) SubCutaneous every 12 hours  fentaNYL   Patch 100 MICROgram(s)/Hr 1 Patch Transdermal every 72 hours  glucagon  Injectable 1 milliGRAM(s) IntraMuscular once  influenza   Vaccine 0.5 milliLiter(s) IntraMuscular once  insulin lispro (ADMELOG) corrective regimen sliding scale   SubCutaneous Before meals and at bedtime  levETIRAcetam 500 milliGRAM(s) Oral every 12 hours  pantoprazole    Tablet 40 milliGRAM(s) Oral before breakfast  polyethylene glycol 3350 17 Gram(s) Oral daily  senna 2 Tablet(s) Oral at bedtime    MEDICATIONS  (PRN):  HYDROmorphone  Injectable 0.5 milliGRAM(s) IV Push every 4 hours PRN Breakthrough pain  ondansetron Injectable 4 milliGRAM(s) IV Push every 6 hours PRN Nausea and/or Vomiting  oxycodone    5 mG/acetaminophen 325 mG 1 Tablet(s) Oral every 4 hours PRN Severe Pain (7 - 10)      Labs:    CBC:                        14.1   11.15 )-----------( 300      ( 2022 11:37 )             42.3     CMP:        138  |  103  |  12  ----------------------------<  151<H>  4.5   |  22  |  0.51    Ca    9.6      2022 11:37    TPro  7.4  /  Alb  3.9  /  TBili  0.3  /  DBili  x   /  AST  30  /  ALT  29  /  AlkPhos  59      PT/INR - ( 2022 11:37 )   PT: 13.6 sec;   INR: 1.14          PTT - ( 2022 11:37 )  PTT:29.8 sec   Urinalysis Basic - ( 2022 13:47 )    Color: Yellow / Appearance: Clear / S.020 / pH: x  Gluc: x / Ketone: Trace mg/dL  / Bili: Negative / Urobili: 0.2 E.U./dL   Blood: x / Protein: 100 mg/dL / Nitrite: NEGATIVE   Leuk Esterase: Trace / RBC: < 5 /HPF / WBC < 5 /HPF   Sq Epi: x / Non Sq Epi: 0-5 /HPF / Bacteria: Present /HPF        RADIOLOGY & ADDITIONAL STUDIES:  Imaging:  Reviewed    < from: Xray Chest 1 View AP/PA (22 @ 12:35) >    IMPRESSION: Large right lower lobe consolidation/mass. Cardiomegaly.    < end of copied text >  < from: CT Head No Cont (22 @ 13:48) >  IMPRESSION:    Extensive areas of vasogenic edema including in the right frontal lobe,   left paramedian parietal lobe, and bilateral cerebellar hemispheres,   findings concerning for intracranial metastatic disease in the setting of   known malignancy. Dedicated MRI brain with and without contrast is   recommended for further assessment.    < end of copied text >  < from: CT Angio Chest PE Protocol w/ IV Cont (22 @ 17:17) >    IMPRESSION:    1.  Limited study due to suboptimal opacification of the pulmonary   arteries and respiratory motion. No large central pulmonary embolism.   Limited evaluation of the more distal lobar and segmental branches for   pulmonary artery emboli.  2.  Slight interval decrease in trace pericardial effusion.  3.  Postobstructive pneumonitis involving the right lower lobe with   encasement and obstruction of the bronchus intermedius, right middle and   right lower lobe bronchi by confluent lymphadenopathy/mediastinal mass as   above. Findings compatible with provided history of advanced stage small   cell carcinoma.    < end of copied text >      PROTEIN CALORIE MALNUTRITION PRESENT:  [ ] Yes  [ ] No  Albumin, Serum:  3.9 ()    [ ] PPSV2 < or = to 30%   [ ] significant weight loss    [ ] poor nutritional intake  [ ] catabolic state   [ ] anasarca       [ ] Artificial Nutrition    PEx:  T(C): 37.3 (22 @ 05:21), Max: 37.3 (22 @ 10:45)  HR: 108 (22 @ 05:21) (106 - 117)  BP: 137/84 (22 @ 05:21) (137/84 - 167/88)  RR: 20 (22 @ 05:21) (20 - 22)  SpO2: 94% (22 @ 05:21) (91% - 98%)  Wt(kg): --    General: alert  oriented x _    lethargic, distressed, cachexia,  nonverbal,  unarousable, verbal  HEENT: atraumatic, No abnormal lesions, No dry mouth,  temporal wasting, ET tube/trach  RESP: Reg rhythm,   tachypnea/labored breathing,   audible excessive secretions,  CTAB, diminished bilat bases  CV: RRR, S1S2,   tachycardia  GI: soft non distended non tender  incontinent               PEG/NG/OG tube                 constipation  last BM:   : normal  incontinent  oliguria/anuria  jacobs  MUSK: weakness x4,  edema, ambulatory with assistance,   mostly/fully  BB/WC bound  SKIN: No abnormal skin lesions, Poor skin turgor, Pressure ulcer stage:   NEURO: Alert, Oriented x   ,  no deficits, cognitive impairment, encephalopathic dsyphagia dysarthria paresis  Oral intake ability: unable/only mouth care, minimal moderate full capability    Current Palliative Performance Scale/Karnofsky Score:    %  Preadmit Karnofsky:   %          ECOG Performance:         BMI: 39  Wt: 128  Cachexia (Y/N): N    PALLIATIVE MEDICINE COORDINATION OF CARE DOCUMENTATION: [x] Inpatient Consult  Non-Face-to-Face prolonged service provided that relates to (face-to-face) care that has or will occur and ongoing patient management, including one or more of the following: - Reviewed documentation from other physicians and other health care professional services - Reviewed medical records and diagnostic / radiology study results - Coordination with patient's support system  ************************************************************************  MEDICATION REVIEW:  - See Medication List Above    ISTOP REFERENCE:   - no active Rxs   - PRN usage: NO PRN'S  ------------------------------------------------------------------------  COORDINATION OF CARE:  - Palliative Care consulted for: GOC / Symptom Management  - Patient (to be) assessed:  - Patient previously seen by Palliative Care service: NO    ADVANCE CARE PLANNING  - Code status: FULL  - MOLST reviewed in chart: NONE; None found on Alpha  - HCP/ Surrogate: NONE found on Alpha  - GOC documents: NONE found on Alpha  - HCP/ Living will/Other Advanced Directives in Alpha: NONE found on Alpha  ------------------------------------------------------------------------  CARE PROVIDER DOCUMENTATION:  - NAKUL/DEDE notes: Remains medically active  - Heme onc: progression through second line tx, no further DMT offered recommending symptom directed care   -     PLAN OF CARE  - Known admissions to [facility] in past year: current  - Current admit date:   - LOS: one day   - LACE score: 13  - Current dispo plan: TO BE DETERMINED    22 (1d)  ------------------------------------------------------------------------  - Time Spent/Chart reviewed: 31 Minutes [including time used to gather, review and transfer data]  - Start: 10:20a  - End: 10:50a    Prolonged services rendered, as part of this patient's care provided by Palliative Medicine, include: i. chart review for provider and ancillary service documentation, ii. pertinent diagnostics including laboratory and imaging studies, iii. medication review including PRN use, iv. admission history including previous palliative care encounters and GOC notes, v. advance care planning documents including HCP and MOLST forms in Alpha. Part of Palliative Medicine extended evaluation and management also involves coordination of care with our IDT, the primary and consulting teams, and unit CM/SW and Hospice if eligible. Recommendations based on the information gathered and discussed are outlined in the A/P of Palliative notes. 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     Patient started getting SOB last summer and it continue to progress. Patient eventually got CT scan performed 10/21 which was concerning for lung Ca. Patient had biopsy performed and was diagnosed with Stage IV squamos cell cancer. She was then referred to Dr. Costa who immediately started chemotherapy (she got chemo 3 x week then would have a 2 week off period. did this for 9 cycles roughly 3 months). Dr. Costa performed pet scan that showed no improvement and then offered patient alternative tx 2 weeks ago. However, patient immediately felt side effects and could not tolerate the chemo. She decided she no longer wants to pursue anymore aggresive therapy or interventions. Today daughter states she was going to take patient to another Oncologist for last resort second opinion. However, patient became to weak and could not even move which prompted them to come to the ED. Patient states she has been fatigued since last friday, has had dec PO intake, felt intermittent palpitations yesterday, intermittent nausea. States her SOB is at baseline and she has a nonproductive cough. Denies fever chills, vomiting, diarrhea, dysuria, frequency, urgency, cough. Daughter states that since intial diagnosis patient has stopped taking all her medications except pain medications (fentanyl 100 mc patch inc from 75 mcg this past weekend by outpatient palliative, and oxy 15 mg IR). Patient currently denying pain.     ED Course: 99.1 F, 117 HR, 149/103 BP,20 RR, 98% on 2 l  WBC 11.15, BMP unremarkable, glucose 151  ua (-)  CT chest-No significant interval change in bulky mediastinal and hilar lymphadenopathy with necrotic 5.2 cm right paratracheal lymph node and conglomerate mass/lymphadenopathy within the subcarinal region measuring 7.5 x 7.9 cm with narrowing and encasement of the bronchus intermedius and obstruction of both the right middle and right lower lobe ronchi.Postobstructive pneumonitis involving the right lower lobe with encasement and obstruction of the bronchus intermedius, right middle and right lower lobe bronchi by confluent lymphadenopathy/mediastinal mass as above. Findings compatible with provided history of advanced stage small cell carcinoma.  CT head-Extensive areas of vasogenic edema including in the right frontal lobe, left paramedian parietal lobe, and bilateral cerebellar hemispheres, findings concerning for intracranial metastatic disease in the setting of known malignancy. Dedicated MRI brain with and without contrast is recommended for further assessment.    Prior medications:amLODIPine Besylate 5 MG, ARIPiprazole 2 MG Oral Tablet,Atorvastatin Calcium 20 MG, BuPROPion HCl ER (SR) 200 MG   FLUoxetine HCl - 40 MG Oral Capsule  Allergies-none  Social hx- smoked 1.5 ppd (until Ca diagnosis then smoked 3-4 cigs until 2 weeks ago), denies drinking/drug use, ambulates with walker, lives with daughter Eve ([phone]) (2022 23:29)    Pt with aggressive malignancy that has progressed through second line treatment. Per her oncologist Dr. Costa's note, not a candidate for chemotherapy. Palliative care consulted for GOC. Pt seen and examined at bedside with Pallaitive NAKUL Pinto. Pts daughter at bedside.    Distressing symptoms of neoplasm related thoracic back pain that wraps around right side, through rib cage. Follows with Dr. Pacheco with outpatient Palliative. Fent 75 mcg patch recently increased to 100 mcg/hr q72. Oxy IR increased from 15 mg to 20 mg PRN. Denies dose limiting side effects, acute nausea, oversedation with opioids. Pain is acceptably controlled on current regimen.     GOC explored as below. Patient and daughter are waiting to speak with Dr. Costa about prognosis, if further DMT will be offered          PRESENT SYMPTOMS:   [ ]Unable to obtain due to poor mentation   Source if other than patient:  [ ]Family   [ ]Team     Pain:  Location :        Quality:  Radiation:  Timing:  Aggravating factors:  Minimal acceptable level (0-10 scale):   Severity in last 24h (0-10 scale) :  Current score (0-10 scale):  Improves with:     PAIN AD Score:   [email or web address] (press ctrl +  left click to view)    If [ ], pt denies symptom.   Dyspnea:         [ ]Mild [ ]Moderate [ ]Severe  Anxiety:           [ ]Mild [ ]Moderate [ ]Severe  Fatigue:           [ ]Mild [ ]Moderate [ ]Severe  Nausea:           [ ]Mild [ ]Moderate [ ]Severe  Loss of appetite:     [ ]Mild [ ]Moderate [ ]Severe  Constipation:   [ ]Mild [ ]Moderate [ ]Severe  LBM   Grief Present   [ ] Yes   [ ] No   Other Symptoms:    [ ]All other review of systems negative     Opiate Naive (Y/N): No   iStop reviewed (Y/N): Yes.   Fentanyl, oxycodone IR Rx found on iStop review (Ref#:  [id number]         PAST MEDICAL & SURGICAL HISTORY:  Hypertension    Stage 4 lung cancer  Chronic obstructive pulmonary disease (COPD)  Emphysema of lung  No significant past surgical history    FAMILY HISTORY:  FH: cancer (Father)     Reviewed and found non contributory in mother or father    SOCIAL HISTORY:   - Support system: [ ] strong [ ] adequate [ ] inadequate    PSYCHOSOCIAL ASSESSMENT:  - Mu-ism/Spiritual practice:  - Role of organized Scientologist [ ] important [ ] some [ ] unable to assess dt pt mentation     - Coping: [ ] well [ ] with difficulty [ ] poor coping [ ] unable to assess dt pt mentation  - What is most important to patient?  - What worries patient the most?  - Is patient at peace? :     Allergies    No Known Allergies    Intolerances        Medications:      MEDICATIONS  (STANDING):  albuterol/ipratropium for Nebulization 3 milliLiter(s) Nebulizer every 6 hours  dexAMETHasone     Tablet 6 milliGRAM(s) Oral daily  dextrose 40% Gel 15 Gram(s) Oral once  dextrose 5%. 1000 milliLiter(s) (50 mL/Hr) IV Continuous <Continuous>  dextrose 5%. 1000 milliLiter(s) (100 mL/Hr) IV Continuous <Continuous>  dextrose 50% Injectable 25 Gram(s) IV Push once  dextrose 50% Injectable 12.5 Gram(s) IV Push once  dextrose 50% Injectable 25 Gram(s) IV Push once  enoxaparin Injectable 40 milliGRAM(s) SubCutaneous every 12 hours  fentaNYL   Patch 100 MICROgram(s)/Hr 1 Patch Transdermal every 72 hours  glucagon  Injectable 1 milliGRAM(s) IntraMuscular once  influenza   Vaccine 0.5 milliLiter(s) IntraMuscular once  insulin lispro (ADMELOG) corrective regimen sliding scale   SubCutaneous Before meals and at bedtime  levETIRAcetam 500 milliGRAM(s) Oral every 12 hours  pantoprazole    Tablet 40 milliGRAM(s) Oral before breakfast  polyethylene glycol 3350 17 Gram(s) Oral daily  senna 2 Tablet(s) Oral at bedtime    MEDICATIONS  (PRN):  HYDROmorphone  Injectable 0.5 milliGRAM(s) IV Push every 4 hours PRN Breakthrough pain  ondansetron Injectable 4 milliGRAM(s) IV Push every 6 hours PRN Nausea and/or Vomiting  oxycodone    5 mG/acetaminophen 325 mG 1 Tablet(s) Oral every 4 hours PRN Severe Pain (7 - 10)      Labs:    CBC:                        14.1   11.15 )-----------( 300      ( 2022 11:37 )             42.3     CMP:        138  |  103  |  12  ----------------------------<  151<H>  4.5   |  22  |  0.51    Ca    9.6      2022 11:37    TPro  7.4  /  Alb  3.9  /  TBili  0.3  /  DBili  x   /  AST  30  /  ALT  29  /  AlkPhos  59      PT/INR - ( 2022 11:37 )   PT: 13.6 sec;   INR: 1.14          PTT - ( 2022 11:37 )  PTT:29.8 sec   Urinalysis Basic - ( 2022 13:47 )    Color: Yellow / Appearance: Clear / S.020 / pH: x  Gluc: x / Ketone: Trace mg/dL  / Bili: Negative / Urobili: 0.2 E.U./dL   Blood: x / Protein: 100 mg/dL / Nitrite: NEGATIVE   Leuk Esterase: Trace / RBC: < 5 /HPF / WBC < 5 /HPF   Sq Epi: x / Non Sq Epi: 0-5 /HPF / Bacteria: Present /HPF        RADIOLOGY & ADDITIONAL STUDIES:  Imaging:  Reviewed    < from: Xray Chest 1 View AP/PA (22 @ 12:35) >    IMPRESSION: Large right lower lobe consolidation/mass. Cardiomegaly.    < end of copied text >  < from: CT Head No Cont (22 @ 13:48) >  IMPRESSION:    Extensive areas of vasogenic edema including in the right frontal lobe,   left paramedian parietal lobe, and bilateral cerebellar hemispheres,   findings concerning for intracranial metastatic disease in the setting of   known malignancy. Dedicated MRI brain with and without contrast is   recommended for further assessment.    < end of copied text >  < from: CT Angio Chest PE Protocol w/ IV Cont (22 @ 17:17) >    IMPRESSION:    1.  Limited study due to suboptimal opacification of the pulmonary   arteries and respiratory motion. No large central pulmonary embolism.   Limited evaluation of the more distal lobar and segmental branches for   pulmonary artery emboli.  2.  Slight interval decrease in trace pericardial effusion.  3.  Postobstructive pneumonitis involving the right lower lobe with   encasement and obstruction of the bronchus intermedius, right middle and   right lower lobe bronchi by confluent lymphadenopathy/mediastinal mass as   above. Findings compatible with provided history of advanced stage small   cell carcinoma.    < end of copied text >      PROTEIN CALORIE MALNUTRITION PRESENT:  [ ] Yes  [ ] No  Albumin, Serum:  3.9 ()    [ ] PPSV2 < or = to 30%   [ ] significant weight loss    [ ] poor nutritional intake  [ ] catabolic state   [ ] anasarca       [ ] Artificial Nutrition    PEx:  T(C): 37.3 (22 @ 05:21), Max: 37.3 (22 @ 10:45)  HR: 108 (22 @ 05:21) (106 - 117)  BP: 137/84 (22 @ 05:21) (137/84 - 167/88)  RR: 20 (22 @ 05:21) (20 - 22)  SpO2: 94% (22 @ 05:21) (91% - 98%)  Wt(kg): --    General: alert  oriented x _    lethargic, distressed, cachexia,  nonverbal,  unarousable, verbal  HEENT: atraumatic, No abnormal lesions, No dry mouth,  temporal wasting, ET tube/trach  RESP: Reg rhythm,   tachypnea/labored breathing,   audible excessive secretions,  CTAB, diminished bilat bases  CV: RRR, S1S2,   tachycardia  GI: soft non distended non tender  incontinent               PEG/NG/OG tube                 constipation  last BM:   : normal  incontinent  oliguria/anuria  jacobs  MUSK: weakness x4,  edema, ambulatory with assistance,   mostly/fully  BB/WC bound  SKIN: No abnormal skin lesions, Poor skin turgor, Pressure ulcer stage:   NEURO: Alert, Oriented x   ,  no deficits, cognitive impairment, encephalopathic dsyphagia dysarthria paresis  Oral intake ability: unable/only mouth care, minimal moderate full capability    Current Palliative Performance Scale/Karnofsky Score:    %  Preadmit Karnofsky:   %          ECOG Performance:         BMI: 39  Wt: 128  Cachexia (Y/N): N    PALLIATIVE MEDICINE COORDINATION OF CARE DOCUMENTATION: [x] Inpatient Consult  Non-Face-to-Face prolonged service provided that relates to (face-to-face) care that has or will occur and ongoing patient management, including one or more of the following: - Reviewed documentation from other physicians and other health care professional services - Reviewed medical records and diagnostic / radiology study results - Coordination with patient's support system  ************************************************************************  MEDICATION REVIEW:  - See Medication List Above    ISTOP REFERENCE:   - no active Rxs   - PRN usage: NO PRN'S  ------------------------------------------------------------------------  COORDINATION OF CARE:  - Palliative Care consulted for: GOC / Symptom Management  - Patient (to be) assessed:  - Patient previously seen by Palliative Care service: NO    ADVANCE CARE PLANNING  - Code status: FULL  - MOLST reviewed in chart: NONE; None found on Alpha  - HCP/ Surrogate: NONE found on Alpha  - GOC documents: NONE found on Alpha  - HCP/ Living will/Other Advanced Directives in Alpha: NONE found on Alpha  ------------------------------------------------------------------------  CARE PROVIDER DOCUMENTATION:  - NAKUL/DDEE notes: Remains medically active  - Heme onc: progression through second line tx, no further DMT offered recommending symptom directed care   -     PLAN OF CARE  - Known admissions to [facility] in past year: current  - Current admit date:   - LOS: one day   - LACE score: 13  - Current dispo plan: TO BE DETERMINED    22 (1d)  ------------------------------------------------------------------------  - Time Spent/Chart reviewed: 31 Minutes [including time used to gather, review and transfer data]  - Start: 10:20a  - End: 10:50a    Prolonged services rendered, as part of this patient's care provided by Palliative Medicine, include: i. chart review for provider and ancillary service documentation, ii. pertinent diagnostics including laboratory and imaging studies, iii. medication review including PRN use, iv. admission history including previous palliative care encounters and GOC notes, v. advance care planning documents including HCP and MOLST forms in Alpha. Part of Palliative Medicine extended evaluation and management also involves coordination of care with our IDT, the primary and consulting teams, and unit CM/SW and Hospice if eligible. Recommendations based on the information gathered and discussed are outlined in the A/P of Palliative notes. 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     Patient started getting SOB last summer and it continue to progress. Patient eventually got CT scan performed 10/21 which was concerning for lung Ca. Patient had biopsy performed and was diagnosed with Stage IV squamos cell cancer. She was then referred to Dr. Costa who immediately started chemotherapy (she got chemo 3 x week then would have a 2 week off period. did this for 9 cycles roughly 3 months). Dr. Costa performed pet scan that showed no improvement and then offered patient alternative tx 2 weeks ago. However, patient immediately felt side effects and could not tolerate the chemo. She decided she no longer wants to pursue anymore aggresive therapy or interventions. Today daughter states she was going to take patient to another Oncologist for last resort second opinion. However, patient became to weak and could not even move which prompted them to come to the ED. Patient states she has been fatigued since last friday, has had dec PO intake, felt intermittent palpitations yesterday, intermittent nausea. States her SOB is at baseline and she has a nonproductive cough. Denies fever chills, vomiting, diarrhea, dysuria, frequency, urgency, cough. Daughter states that since intial diagnosis patient has stopped taking all her medications except pain medications (fentanyl 100 mc patch inc from 75 mcg this past weekend by outpatient palliative, and oxy 15 mg IR). Patient currently denying pain.     ED Course: 99.1 F, 117 HR, 149/103 BP,20 RR, 98% on 2 l  WBC 11.15, BMP unremarkable, glucose 151  ua (-)  CT chest-No significant interval change in bulky mediastinal and hilar lymphadenopathy with necrotic 5.2 cm right paratracheal lymph node and conglomerate mass/lymphadenopathy within the subcarinal region measuring 7.5 x 7.9 cm with narrowing and encasement of the bronchus intermedius and obstruction of both the right middle and right lower lobe ronchi.Postobstructive pneumonitis involving the right lower lobe with encasement and obstruction of the bronchus intermedius, right middle and right lower lobe bronchi by confluent lymphadenopathy/mediastinal mass as above. Findings compatible with provided history of advanced stage small cell carcinoma.  CT head-Extensive areas of vasogenic edema including in the right frontal lobe, left paramedian parietal lobe, and bilateral cerebellar hemispheres, findings concerning for intracranial metastatic disease in the setting of known malignancy. Dedicated MRI brain with and without contrast is recommended for further assessment.    Prior medications:amLODIPine Besylate 5 MG, ARIPiprazole 2 MG Oral Tablet,Atorvastatin Calcium 20 MG, BuPROPion HCl ER (SR) 200 MG   FLUoxetine HCl - 40 MG Oral Capsule  Allergies-none   (2022 23:29)    Pt with aggressive malignancy that has progressed through second line treatment. Per her oncologist Dr. Costa's note, not a candidate for chemotherapy. Palliative care consulted for GOC. Pt seen and examined at bedside with Pallaitive NAKUL Pinto. Pts daughter at bedside.    Distressing symptoms of neoplasm related thoracic back pain that wraps around right side, through rib cage. Follows with Dr. Pacheco with outpatient Palliative. Fent 75 mcg patch recently increased to 100 mcg/hr q72. Oxy IR increased from 15 mg to 20 mg PRN. Denies dose limiting side effects, acute nausea, oversedation with opioids. Pain is acceptably controlled on current regimen.     GOC explored as below. Patient and daughter are waiting to speak with Dr. Costa about prognosis, if further DMT will be offered          PRESENT SYMPTOMS:   [ ]Unable to obtain due to poor mentation   Source if other than patient:  [ ]Family   [ ]Team     Pain:  Location :        Quality:  Radiation:  Timing:  Aggravating factors:  Minimal acceptable level (0-10 scale):   Severity in last 24h (0-10 scale) :  Current score (0-10 scale):  Improves with:     PAIN AD Score:   [email or web address] (press ctrl +  left click to view)    If [ ], pt denies symptom.   Dyspnea:         [ ]Mild [ ]Moderate [ ]Severe  Anxiety:           [ ]Mild [ ]Moderate [ ]Severe  Fatigue:           [ ]Mild [ ]Moderate [ ]Severe  Nausea:           [ ]Mild [ ]Moderate [ ]Severe  Loss of appetite:     [ ]Mild [ ]Moderate [ ]Severe  Constipation:   [ ]Mild [ ]Moderate [ ]Severe  LBM   Grief Present   [ ] Yes   [ ] No   Other Symptoms:    [ ]All other review of systems negative     Opiate Naive (Y/N): No   iStop reviewed (Y/N): Yes.   Fentanyl, oxycodone IR Rx found on iStop review (Ref#:  [id number]         PAST MEDICAL & SURGICAL HISTORY:  Hypertension    Stage 4 lung cancer  Chronic obstructive pulmonary disease (COPD)  Emphysema of lung  No significant past surgical history    FAMILY HISTORY:  FH: cancer (Father)     Reviewed and found non contributory in mother or father    SOCIAL HISTORY:   - Support system: [ ] strong [ ] adequate [ ] inadequate  - Social hx- smoked 1.5 ppd (until Ca diagnosis then smoked 3-4 cigs until 2 weeks ago), denies drinking/drug use, ambulates with walker, lives with daughter Eve ([phone])    PSYCHOSOCIAL ASSESSMENT:  - Samaritan/Spiritual practice:  - Role of organized Christian [ ] important [ ] some [ ] unable to assess dt pt mentation     - Coping: [ ] well [ ] with difficulty [ ] poor coping [ ] unable to assess dt pt mentation  - What is most important to patient?  - What worries patient the most?  - Is patient at peace? :     Allergies    No Known Allergies    Intolerances        Medications:      MEDICATIONS  (STANDING):  albuterol/ipratropium for Nebulization 3 milliLiter(s) Nebulizer every 6 hours  dexAMETHasone     Tablet 6 milliGRAM(s) Oral daily  dextrose 40% Gel 15 Gram(s) Oral once  dextrose 5%. 1000 milliLiter(s) (50 mL/Hr) IV Continuous <Continuous>  dextrose 5%. 1000 milliLiter(s) (100 mL/Hr) IV Continuous <Continuous>  dextrose 50% Injectable 25 Gram(s) IV Push once  dextrose 50% Injectable 12.5 Gram(s) IV Push once  dextrose 50% Injectable 25 Gram(s) IV Push once  enoxaparin Injectable 40 milliGRAM(s) SubCutaneous every 12 hours  fentaNYL   Patch 100 MICROgram(s)/Hr 1 Patch Transdermal every 72 hours  glucagon  Injectable 1 milliGRAM(s) IntraMuscular once  influenza   Vaccine 0.5 milliLiter(s) IntraMuscular once  insulin lispro (ADMELOG) corrective regimen sliding scale   SubCutaneous Before meals and at bedtime  levETIRAcetam 500 milliGRAM(s) Oral every 12 hours  pantoprazole    Tablet 40 milliGRAM(s) Oral before breakfast  polyethylene glycol 3350 17 Gram(s) Oral daily  senna 2 Tablet(s) Oral at bedtime    MEDICATIONS  (PRN):  HYDROmorphone  Injectable 0.5 milliGRAM(s) IV Push every 4 hours PRN Breakthrough pain  ondansetron Injectable 4 milliGRAM(s) IV Push every 6 hours PRN Nausea and/or Vomiting  oxycodone    5 mG/acetaminophen 325 mG 1 Tablet(s) Oral every 4 hours PRN Severe Pain (7 - 10)      Labs:    CBC:                        14.1   11.15 )-----------( 300      ( 2022 11:37 )             42.3     CMP:        138  |  103  |  12  ----------------------------<  151<H>  4.5   |  22  |  0.51    Ca    9.6      2022 11:37    TPro  7.4  /  Alb  3.9  /  TBili  0.3  /  DBili  x   /  AST  30  /  ALT  29  /  AlkPhos  59      PT/INR - ( 2022 11:37 )   PT: 13.6 sec;   INR: 1.14          PTT - ( 2022 11:37 )  PTT:29.8 sec   Urinalysis Basic - ( 2022 13:47 )    Color: Yellow / Appearance: Clear / S.020 / pH: x  Gluc: x / Ketone: Trace mg/dL  / Bili: Negative / Urobili: 0.2 E.U./dL   Blood: x / Protein: 100 mg/dL / Nitrite: NEGATIVE   Leuk Esterase: Trace / RBC: < 5 /HPF / WBC < 5 /HPF   Sq Epi: x / Non Sq Epi: 0-5 /HPF / Bacteria: Present /HPF        RADIOLOGY & ADDITIONAL STUDIES:  Imaging:  Reviewed    < from: Xray Chest 1 View AP/PA (22 @ 12:35) >    IMPRESSION: Large right lower lobe consolidation/mass. Cardiomegaly.    < end of copied text >  < from: CT Head No Cont (22 @ 13:48) >  IMPRESSION:    Extensive areas of vasogenic edema including in the right frontal lobe,   left paramedian parietal lobe, and bilateral cerebellar hemispheres,   findings concerning for intracranial metastatic disease in the setting of   known malignancy. Dedicated MRI brain with and without contrast is   recommended for further assessment.    < end of copied text >  < from: CT Angio Chest PE Protocol w/ IV Cont (22 @ 17:17) >    IMPRESSION:    1.  Limited study due to suboptimal opacification of the pulmonary   arteries and respiratory motion. No large central pulmonary embolism.   Limited evaluation of the more distal lobar and segmental branches for   pulmonary artery emboli.  2.  Slight interval decrease in trace pericardial effusion.  3.  Postobstructive pneumonitis involving the right lower lobe with   encasement and obstruction of the bronchus intermedius, right middle and   right lower lobe bronchi by confluent lymphadenopathy/mediastinal mass as   above. Findings compatible with provided history of advanced stage small   cell carcinoma.    < end of copied text >      PROTEIN CALORIE MALNUTRITION PRESENT:  [ ] Yes  [ ] No  Albumin, Serum:  3.9 ()    [ ] PPSV2 < or = to 30%   [ ] significant weight loss    [ ] poor nutritional intake  [ ] catabolic state   [ ] anasarca       [ ] Artificial Nutrition    PEx:  T(C): 37.3 (22 @ 05:21), Max: 37.3 (22 @ 10:45)  HR: 108 (22 @ 05:21) (106 - 117)  BP: 137/84 (22 @ 05:21) (137/84 - 167/88)  RR: 20 (22 @ 05:21) (20 - 22)  SpO2: 94% (22 @ 05:21) (91% - 98%)  Wt(kg): --    General: alert  oriented x _    lethargic, distressed, cachexia,  nonverbal,  unarousable, verbal  HEENT: atraumatic, No abnormal lesions, No dry mouth,  temporal wasting, ET tube/trach  RESP: Reg rhythm,   tachypnea/labored breathing,   audible excessive secretions,  CTAB, diminished bilat bases  CV: RRR, S1S2,   tachycardia  GI: soft non distended non tender  incontinent               PEG/NG/OG tube                 constipation  last BM:   : normal  incontinent  oliguria/anuria  jacobs  MUSK: weakness x4,  edema, ambulatory with assistance,   mostly/fully  BB/WC bound  SKIN: No abnormal skin lesions, Poor skin turgor, Pressure ulcer stage:   NEURO: Alert, Oriented x   ,  no deficits, cognitive impairment, encephalopathic dsyphagia dysarthria paresis  Oral intake ability: unable/only mouth care, minimal moderate full capability    Current Palliative Performance Scale/Karnofsky Score:    %  Preadmit Karnofsky:   %          ECOG Performance:         BMI: 39  Wt: 128  Cachexia (Y/N): N    PALLIATIVE MEDICINE COORDINATION OF CARE DOCUMENTATION: [x] Inpatient Consult  Non-Face-to-Face prolonged service provided that relates to (face-to-face) care that has or will occur and ongoing patient management, including one or more of the following: - Reviewed documentation from other physicians and other health care professional services - Reviewed medical records and diagnostic / radiology study results - Coordination with patient's support system  ************************************************************************  MEDICATION REVIEW:  - See Medication List Above    ISTOP REFERENCE:   - no active Rxs   - PRN usage: NO PRN'S  ------------------------------------------------------------------------  COORDINATION OF CARE:  - Palliative Care consulted for: GOC / Symptom Management  - Patient (to be) assessed:  - Patient previously seen by Palliative Care service: NO    ADVANCE CARE PLANNING  - Code status: FULL  - MOLST reviewed in chart: NONE; None found on Alpha  - HCP/ Surrogate: NONE found on Alpha  - GOC documents: NONE found on Alpha  - HCP/ Living will/Other Advanced Directives in Alpha: NONE found on Alpha  ------------------------------------------------------------------------  CARE PROVIDER DOCUMENTATION:  - NAKUL/DEDE notes: Remains medically active  - Heme onc: progression through second line tx, no further DMT offered recommending symptom directed care   -     PLAN OF CARE  - Known admissions to [facility] in past year: current  - Current admit date:   - LOS: one day   - LACE score: 13  - Current dispo plan: TO BE DETERMINED    22 (1d)  ------------------------------------------------------------------------  - Time Spent/Chart reviewed: 31 Minutes [including time used to gather, review and transfer data]  - Start: 10:20a  - End: 10:50a    Prolonged services rendered, as part of this patient's care provided by Palliative Medicine, include: i. chart review for provider and ancillary service documentation, ii. pertinent diagnostics including laboratory and imaging studies, iii. medication review including PRN use, iv. admission history including previous palliative care encounters and GOC notes, v. advance care planning documents including HCP and MOLST forms in Alpha. Part of Palliative Medicine extended evaluation and management also involves coordination of care with our IDT, the primary and consulting teams, and unit CM/SW and Hospice if eligible. Recommendations based on the information gathered and discussed are outlined in the A/P of Palliative notes. 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     Patient started getting SOB last summer and it continue to progress. Patient eventually got CT scan performed 10/21 which was concerning for lung Ca. Patient had biopsy performed and was diagnosed with Stage IV squamos cell cancer. She was then referred to Dr. Costa who immediately started chemotherapy (she got chemo 3 x week then would have a 2 week off period. did this for 9 cycles roughly 3 months). Dr. Costa performed pet scan that showed no improvement and then offered patient alternative tx 2 weeks ago. However, patient immediately felt side effects and could not tolerate the chemo. She decided she no longer wants to pursue anymore aggresive therapy or interventions. Today daughter states she was going to take patient to another Oncologist for last resort second opinion. However, patient became to weak and could not even move which prompted them to come to the ED. Patient states she has been fatigued since last friday, has had dec PO intake, felt intermittent palpitations yesterday, intermittent nausea. States her SOB is at baseline and she has a nonproductive cough. Denies fever chills, vomiting, diarrhea, dysuria, frequency, urgency, cough. Daughter states that since intial diagnosis patient has stopped taking all her medications except pain medications (fentanyl 100 mc patch inc from 75 mcg this past weekend by outpatient palliative, and oxy 15 mg IR). Patient currently denying pain.     ED Course: 99.1 F, 117 HR, 149/103 BP,20 RR, 98% on 2 l  WBC 11.15, BMP unremarkable, glucose 151  ua (-)  CT chest-No significant interval change in bulky mediastinal and hilar lymphadenopathy with necrotic 5.2 cm right paratracheal lymph node and conglomerate mass/lymphadenopathy within the subcarinal region measuring 7.5 x 7.9 cm with narrowing and encasement of the bronchus intermedius and obstruction of both the right middle and right lower lobe ronchi.Postobstructive pneumonitis involving the right lower lobe with encasement and obstruction of the bronchus intermedius, right middle and right lower lobe bronchi by confluent lymphadenopathy/mediastinal mass as above. Findings compatible with provided history of advanced stage small cell carcinoma.  CT head-Extensive areas of vasogenic edema including in the right frontal lobe, left paramedian parietal lobe, and bilateral cerebellar hemispheres, findings concerning for intracranial metastatic disease in the setting of known malignancy. Dedicated MRI brain with and without contrast is recommended for further assessment.    Prior medications:amLODIPine Besylate 5 MG, ARIPiprazole 2 MG Oral Tablet,Atorvastatin Calcium 20 MG, BuPROPion HCl ER (SR) 200 MG   FLUoxetine HCl - 40 MG Oral Capsule  Allergies-none   (2022 23:29)    Pt with aggressive malignancy that has progressed through second line treatment. Per her oncologist Dr. Costa's note, not a candidate for chemotherapy. Palliative care consulted for [facility]. Pt seen and examined at bedside with Pallcampos Pinto. Pts daughter at bedside.    Distressing symptoms of neoplasm related back pain that wraps around right side, through rib cage. Further assessment below. Follows with Dr. Pacheco with outpatient Palliative. Fent 75 mcg patch recently increased to 100 mcg/hr q72. Oxy IR increased from 15 mg to 20 mg PRN. Denies dose limiting side effects, acute nausea, oversedation with opioids. Pain is acceptably controlled on current regimen.     Several conversations with pts daughter and patient today. [facility] in further detail below. Ultimately with clear understanding that all DMT are palliative not curative in intent. Previously pt had declined brain XRT because of fear that she would need a Gtube (not within GOC) and also had increased anxiety/dyspnea when lying flat for SIM. Patient and daughter are waiting to speak with Dr. Costa about pursuing palliative brain XRT. They also have an appointment at [facility] next week.       PRESENT SYMPTOMS:   [ ]Unable to obtain due to poor mentation   Source if other than patient:  [ ]Family   [ ]Team     Pain:  Location :   back localized to thoracic spine dermatomes, wrapping around right rib cage   Quality: stabbing   Timing: constant. worse with coughing   Aggravating factors: disease progression, coughing   Minimal acceptable level (0-10 scale): 0/10  Severity in last 24h (0-10 scale) : mod/severe with coughing  Current score (0-10 scale): 0/10  Improves with: current opioid regimen per outpatient pall MD     PAIN AD Score: 2  [email or web address] (press ctrl +  left click to view)    If [ ], pt denies symptom.   Dyspnea:         [ ]Mild [x ]Moderate at rest  [ ]Severe  Anxiety:           [ ]Mild [ ]Moderate [ ]Severe  Fatigue:           [x ]Mild [ ]Moderate [ ]Severe  Nausea:           [ ]Mild [ ]Moderate [ ]Severe ; vomited prior to admission; none this admission   Loss of appetite:     [ ]Mild [x ]Moderate [ ]Severe  Constipation:   [ ]Mild [ ]Moderate [x ]Severe  LBM   Grief Present   [x ] Yes   [ ] No   Other Symptoms: intermittently with cough     [x ]All other review of systems negative     Opiate Naive (Y/N): No   iStop reviewed (Y/N): Yes.   Fentanyl, oxycodone IR Rx found on iStop review (Ref#:  [id number]         PAST MEDICAL & SURGICAL HISTORY:  Hypertension  Stage 4 lung cancer  Chronic obstructive pulmonary disease (COPD)  Emphysema of lung  No significant past surgical history    FAMILY HISTORY:  FH: cancer (Father)    SOCIAL HISTORY:   - Support system: [ ] strong [x ] adequate [ ] inadequate  - Occitan   - formally worked in hospitality  - two children, son  from OD several years ago; pts daughter Eve very involved in pts care   - smoked 1.5 ppd (until Ca diagnosis then smoked 3-4 cigs until 2 weeks prior to admission),  - denies drinking/drug use  - prior to admission ambulates with walker  - lives in [place] with daughter Eve ([phone])    PSYCHOSOCIAL ASSESSMENT:  - Anabaptism/Spiritual practice:  - Role of organized Mandaen [ ] important [ ] some [ ] unable to assess dt pt mentation     - Coping: [x ] well [ ] with difficulty [ ] poor coping   - What is most important to patient? Patient and daughter Eve want to speaking with her oncologist    Allergies  No Known Allergies  Intolerances    Medications:      MEDICATIONS  (STANDING):  albuterol/ipratropium for Nebulization 3 milliLiter(s) Nebulizer every 6 hours  dexAMETHasone     Tablet 6 milliGRAM(s) Oral daily  dextrose 40% Gel 15 Gram(s) Oral once  dextrose 5%. 1000 milliLiter(s) (50 mL/Hr) IV Continuous <Continuous>  dextrose 5%. 1000 milliLiter(s) (100 mL/Hr) IV Continuous <Continuous>  dextrose 50% Injectable 25 Gram(s) IV Push once  dextrose 50% Injectable 12.5 Gram(s) IV Push once  dextrose 50% Injectable 25 Gram(s) IV Push once  enoxaparin Injectable 40 milliGRAM(s) SubCutaneous every 12 hours  fentaNYL   Patch 100 MICROgram(s)/Hr 1 Patch Transdermal every 72 hours  glucagon  Injectable 1 milliGRAM(s) IntraMuscular once  influenza   Vaccine 0.5 milliLiter(s) IntraMuscular once  insulin lispro (ADMELOG) corrective regimen sliding scale   SubCutaneous Before meals and at bedtime  levETIRAcetam 500 milliGRAM(s) Oral every 12 hours  pantoprazole    Tablet 40 milliGRAM(s) Oral before breakfast  polyethylene glycol 3350 17 Gram(s) Oral daily  senna 2 Tablet(s) Oral at bedtime    MEDICATIONS  (PRN):  HYDROmorphone  Injectable 0.5 milliGRAM(s) IV Push every 4 hours PRN Breakthrough pain  ondansetron Injectable 4 milliGRAM(s) IV Push every 6 hours PRN Nausea and/or Vomiting  oxycodone    5 mG/acetaminophen 325 mG 1 Tablet(s) Oral every 4 hours PRN Severe Pain (7 - 10)      Labs:    CBC:                        14.1   11.15 )-----------( 300      ( 2022 11:37 )             42.3     CMP:        138  |  103  |  12  ----------------------------<  151<H>  4.5   |  22  |  0.51    Ca    9.6      2022 11:37    TPro  7.4  /  Alb  3.9  /  TBili  0.3  /  DBili  x   /  AST  30  /  ALT  29  /  AlkPhos  59      PT/INR - ( 2022 11:37 )   PT: 13.6 sec;   INR: 1.14          PTT - ( 2022 11:37 )  PTT:29.8 sec   Urinalysis Basic - ( 2022 13:47 )    Color: Yellow / Appearance: Clear / S.020 / pH: x  Gluc: x / Ketone: Trace mg/dL  / Bili: Negative / Urobili: 0.2 E.U./dL   Blood: x / Protein: 100 mg/dL / Nitrite: NEGATIVE   Leuk Esterase: Trace / RBC: < 5 /HPF / WBC < 5 /HPF   Sq Epi: x / Non Sq Epi: 0-5 /HPF / Bacteria: Present /HPF        RADIOLOGY & ADDITIONAL STUDIES:  Imaging:  Reviewed    < from: Xray Chest 1 View AP/PA (22 @ 12:35) >    IMPRESSION: Large right lower lobe consolidation/mass. Cardiomegaly.    < end of copied text >  < from: CT Head No Cont (22 @ 13:48) >  IMPRESSION:    Extensive areas of vasogenic edema including in the right frontal lobe,   left paramedian parietal lobe, and bilateral cerebellar hemispheres,   findings concerning for intracranial metastatic disease in the setting of   known malignancy. Dedicated MRI brain with and without contrast is   recommended for further assessment.    < end of copied text >  < from: CT Angio Chest PE Protocol w/ IV Cont (22 @ 17:17) >    IMPRESSION:    1.  Limited study due to suboptimal opacification of the pulmonary   arteries and respiratory motion. No large central pulmonary embolism.   Limited evaluation of the more distal lobar and segmental branches for   pulmonary artery emboli.  2.  Slight interval decrease in trace pericardial effusion.  3.  Postobstructive pneumonitis involving the right lower lobe with   encasement and obstruction of the bronchus intermedius, right middle and   right lower lobe bronchi by confluent lymphadenopathy/mediastinal mass as   above. Findings compatible with provided history of advanced stage small   cell carcinoma.    < end of copied text >      PROTEIN CALORIE MALNUTRITION PRESENT:  [ ] Yes  [x ] No  Albumin, Serum:  3.9 ()    [ ] PPSV2 < or = to 30%   [ ] significant weight loss    [x ] poor nutritional intake  [ ] catabolic state   [ ] anasarca       [ ] Artificial Nutrition    PEx:  T(C): 37.3 (22 @ 05:21), Max: 37.3 (22 @ 10:45)  HR: 108 (22 @ 05:21) (106 - 117)  BP: 137/84 (22 @ 05:21) (137/84 - 167/88)  RR: 20 (22 @ 05:21) (20 - 22)  SpO2: 94% (22 @ 05:21) (91% - 98%)  Wt(kg): --    General: alert  oriented x 3 verbal  HEENT: atraumatic, +alopecia, No abnormal lesions, No dry mouth, No temporal wasting, ET tube/trach  RESP: Reg rhythm,  mild labored breathing with several sentence conversation and when repositioning in bed,  No  audible excessive secretions, diminished bilat bases and R lung   CV: RRR, S1S2,  + tachycardia  GI: soft non distended non tender  incontinent          No     PEG/NG/OG tube                 constipation  last BM:   : normal   MUSK: weakness x4,  edema, mostly BB/WC bound  SKIN: No abnormal skin lesions, Poor skin turgor  NEURO: Alert, Oriented x  3 ,  intermittently with word finding issues, No encephalopathic dsyphagia dysarthria paresis  Oral intake ability: full capability, poor intake/appetite     Current Palliative Performance Scale/Karnofsky Score:  40  %    ECOG Performance:     3    BMI: 39  Wt: 128  Cachexia (Y/N): N    PALLIATIVE MEDICINE COORDINATION OF CARE DOCUMENTATION: [x] Inpatient Consult  Non-Face-to-Face prolonged service provided that relates to (face-to-face) care that has or will occur and ongoing patient management, including one or more of the following: - Reviewed documentation from other physicians and other health care professional services - Reviewed medical records and diagnostic / radiology study results - Coordination with patient's support system  ************************************************************************  MEDICATION REVIEW:  - See Medication List Above    ISTOP REFERENCE:   - no active Rxs   - PRN usage: NO PRN'S  ------------------------------------------------------------------------  COORDINATION OF CARE:  - Palliative Care consulted for: GOC / Symptom Management  - Patient (to be) assessed: 3/1  - Patient previously seen by Palliative Care service: follows with outpatient pall MD     ADVANCE CARE PLANNING  - Code status: DNR DNI   - MOLST in physical chart   - Surrogate: daughter Eve   - [facility] documents:    - HCP/ Living will/Other Advanced Directives in Alpha: NONE found on Alpha  ------------------------------------------------------------------------  CARE PROVIDER DOCUMENTATION:  - SW/CM notes: Remains medically active  - Heme onc: progression through second line tx, prognosis grim, no chemo to be offered right now, benefit of further systemic therapy is minimal at best     PLAN OF CARE  - Known admissions to [facility] in past year: current  - Current admit date:   - LOS: one day   - LACE score: 13  - Current dispo plan: TO BE DETERMINED    22 (1d)  ------------------------------------------------------------------------  - Time Spent/Chart reviewed: 31 Minutes [including time used to gather, review and transfer data]  - Start: 10:20a  - End: 10:50a    Prolonged services rendered, as part of this patient's care provided by Palliative Medicine, include: i. chart review for provider and ancillary service documentation, ii. pertinent diagnostics including laboratory and imaging studies, iii. medication review including PRN use, iv. admission history including previous palliative care encounters and GOC notes, v. advance care planning documents including HCP and MOLST forms in Alpha. Part of Palliative Medicine extended evaluation and management also involves coordination of care with our IDT, the primary and consulting teams, and unit CM/SW and Hospice if eligible. Recommendations based on the information gathered and discussed are outlined in the A/P of Palliative notes.

## 2022-03-02 ENCOUNTER — TRANSCRIPTION ENCOUNTER (OUTPATIENT)
Age: 67
End: 2022-03-02

## 2022-03-02 LAB
ANION GAP SERPL CALC-SCNC: 14 MMOL/L — SIGNIFICANT CHANGE UP (ref 5–17)
BASOPHILS # BLD AUTO: 0.02 K/UL — SIGNIFICANT CHANGE UP (ref 0–0.2)
BASOPHILS NFR BLD AUTO: 0.1 % — SIGNIFICANT CHANGE UP (ref 0–2)
BUN SERPL-MCNC: 21 MG/DL — SIGNIFICANT CHANGE UP (ref 7–23)
CALCIUM SERPL-MCNC: 9 MG/DL — SIGNIFICANT CHANGE UP (ref 8.4–10.5)
CHLORIDE SERPL-SCNC: 102 MMOL/L — SIGNIFICANT CHANGE UP (ref 96–108)
CO2 SERPL-SCNC: 20 MMOL/L — LOW (ref 22–31)
CREAT SERPL-MCNC: 0.63 MG/DL — SIGNIFICANT CHANGE UP (ref 0.5–1.3)
EGFR: 99 ML/MIN/1.73M2 — SIGNIFICANT CHANGE UP
EOSINOPHIL # BLD AUTO: 0.01 K/UL — SIGNIFICANT CHANGE UP (ref 0–0.5)
EOSINOPHIL NFR BLD AUTO: 0.1 % — SIGNIFICANT CHANGE UP (ref 0–6)
GLUCOSE BLDC GLUCOMTR-MCNC: 121 MG/DL — HIGH (ref 70–99)
GLUCOSE BLDC GLUCOMTR-MCNC: 144 MG/DL — HIGH (ref 70–99)
GLUCOSE BLDC GLUCOMTR-MCNC: 157 MG/DL — HIGH (ref 70–99)
GLUCOSE BLDC GLUCOMTR-MCNC: 186 MG/DL — HIGH (ref 70–99)
GLUCOSE SERPL-MCNC: 183 MG/DL — HIGH (ref 70–99)
HCT VFR BLD CALC: 42.8 % — SIGNIFICANT CHANGE UP (ref 34.5–45)
HGB BLD-MCNC: 13.9 G/DL — SIGNIFICANT CHANGE UP (ref 11.5–15.5)
IMM GRANULOCYTES NFR BLD AUTO: 0.5 % — SIGNIFICANT CHANGE UP (ref 0–1.5)
LYMPHOCYTES # BLD AUTO: 2.86 K/UL — SIGNIFICANT CHANGE UP (ref 1–3.3)
LYMPHOCYTES # BLD AUTO: 21.1 % — SIGNIFICANT CHANGE UP (ref 13–44)
MAGNESIUM SERPL-MCNC: 2.2 MG/DL — SIGNIFICANT CHANGE UP (ref 1.6–2.6)
MCHC RBC-ENTMCNC: 30.3 PG — SIGNIFICANT CHANGE UP (ref 27–34)
MCHC RBC-ENTMCNC: 32.5 GM/DL — SIGNIFICANT CHANGE UP (ref 32–36)
MCV RBC AUTO: 93.2 FL — SIGNIFICANT CHANGE UP (ref 80–100)
MONOCYTES # BLD AUTO: 1.26 K/UL — HIGH (ref 0–0.9)
MONOCYTES NFR BLD AUTO: 9.3 % — SIGNIFICANT CHANGE UP (ref 2–14)
NEUTROPHILS # BLD AUTO: 9.35 K/UL — HIGH (ref 1.8–7.4)
NEUTROPHILS NFR BLD AUTO: 68.9 % — SIGNIFICANT CHANGE UP (ref 43–77)
NRBC # BLD: 0 /100 WBCS — SIGNIFICANT CHANGE UP (ref 0–0)
PHOSPHATE SERPL-MCNC: 3 MG/DL — SIGNIFICANT CHANGE UP (ref 2.5–4.5)
PLATELET # BLD AUTO: 306 K/UL — SIGNIFICANT CHANGE UP (ref 150–400)
POTASSIUM SERPL-MCNC: 4 MMOL/L — SIGNIFICANT CHANGE UP (ref 3.5–5.3)
POTASSIUM SERPL-SCNC: 4 MMOL/L — SIGNIFICANT CHANGE UP (ref 3.5–5.3)
RBC # BLD: 4.59 M/UL — SIGNIFICANT CHANGE UP (ref 3.8–5.2)
RBC # FLD: 14.6 % — HIGH (ref 10.3–14.5)
SODIUM SERPL-SCNC: 136 MMOL/L — SIGNIFICANT CHANGE UP (ref 135–145)
WBC # BLD: 13.57 K/UL — HIGH (ref 3.8–10.5)
WBC # FLD AUTO: 13.57 K/UL — HIGH (ref 3.8–10.5)

## 2022-03-02 PROCEDURE — 99233 SBSQ HOSP IP/OBS HIGH 50: CPT

## 2022-03-02 RX ORDER — LANOLIN ALCOHOL/MO/W.PET/CERES
3 CREAM (GRAM) TOPICAL AT BEDTIME
Refills: 0 | Status: DISCONTINUED | OUTPATIENT
Start: 2022-03-02 | End: 2022-03-04

## 2022-03-02 RX ORDER — HYDROCORTISONE 1 %
1 OINTMENT (GRAM) TOPICAL ONCE
Refills: 0 | Status: COMPLETED | OUTPATIENT
Start: 2022-03-02 | End: 2022-03-03

## 2022-03-02 RX ADMIN — ENOXAPARIN SODIUM 40 MILLIGRAM(S): 100 INJECTION SUBCUTANEOUS at 23:53

## 2022-03-02 RX ADMIN — OXYCODONE HYDROCHLORIDE 10 MILLIGRAM(S): 5 TABLET ORAL at 19:18

## 2022-03-02 RX ADMIN — Medication 3 MILLIGRAM(S): at 22:13

## 2022-03-02 RX ADMIN — LEVETIRACETAM 500 MILLIGRAM(S): 250 TABLET, FILM COATED ORAL at 05:34

## 2022-03-02 RX ADMIN — PANTOPRAZOLE SODIUM 40 MILLIGRAM(S): 20 TABLET, DELAYED RELEASE ORAL at 09:05

## 2022-03-02 RX ADMIN — FENTANYL CITRATE 1 PATCH: 50 INJECTION INTRAVENOUS at 19:09

## 2022-03-02 RX ADMIN — LEVETIRACETAM 500 MILLIGRAM(S): 250 TABLET, FILM COATED ORAL at 18:43

## 2022-03-02 RX ADMIN — Medication 2: at 18:41

## 2022-03-02 RX ADMIN — OXYCODONE HYDROCHLORIDE 20 MILLIGRAM(S): 5 TABLET ORAL at 00:00

## 2022-03-02 RX ADMIN — SENNA PLUS 2 TABLET(S): 8.6 TABLET ORAL at 22:13

## 2022-03-02 RX ADMIN — FENTANYL CITRATE 1 PATCH: 50 INJECTION INTRAVENOUS at 08:09

## 2022-03-02 RX ADMIN — OXYCODONE HYDROCHLORIDE 20 MILLIGRAM(S): 5 TABLET ORAL at 05:34

## 2022-03-02 RX ADMIN — Medication 2: at 11:41

## 2022-03-02 RX ADMIN — ENOXAPARIN SODIUM 40 MILLIGRAM(S): 100 INJECTION SUBCUTANEOUS at 11:27

## 2022-03-02 RX ADMIN — Medication 6 MILLIGRAM(S): at 05:35

## 2022-03-02 NOTE — DISCHARGE NOTE PROVIDER - NSDCMRMEDTOKEN_GEN_ALL_CORE_FT
fentaNYL 100 mcg/hr transdermal film, extended release: 1 film(s) transdermal every 72 hours  oxyCODONE 15 mg oral tablet: 1 tab(s) orally every 6 hours   fentaNYL 100 mcg/hr transdermal film, extended release: 1 film(s) transdermal every 72 hours  levETIRAcetam 500 mg oral tablet: 1 tab(s) orally every 12 hours  oxyCODONE 15 mg oral tablet: 1 tab(s) orally every 6 hours  polyethylene glycol 3350 oral powder for reconstitution: 17 gram(s) orally once a day  senna oral tablet: 2 tab(s) orally once a day (at bedtime)

## 2022-03-02 NOTE — PROGRESS NOTE ADULT - SUBJECTIVE AND OBJECTIVE BOX
*INCOMPLETE NOTE*    Hospital course:    INTERVAL HPI/OVERNIGHT EVENTS:  As per night team, no overnight events. Patient seen and examined at bedside. Patient denies fever, chills, dizziness, weakness, HA, CP, SOB, N/V/D/C    VITALS  Vital Signs Last 24 Hrs  T(C): 36.8 (02 Mar 2022 06:13), Max: 37 (01 Mar 2022 13:10)  T(F): 98.3 (02 Mar 2022 06:13), Max: 98.6 (01 Mar 2022 13:10)  HR: 99 (02 Mar 2022 06:13) (99 - 102)  BP: 123/79 (02 Mar 2022 06:13) (123/79 - 146/75)  BP(mean): --  RR: 18 (02 Mar 2022 06:13) (18 - 19)  SpO2: 94% (02 Mar 2022 06:13) (91% - 94%)    CAPILLARY BLOOD GLUCOSE      POCT Blood Glucose.: 144 mg/dL (02 Mar 2022 08:37)  POCT Blood Glucose.: 140 mg/dL (01 Mar 2022 22:03)  POCT Blood Glucose.: 156 mg/dL (01 Mar 2022 17:20)  POCT Blood Glucose.: 191 mg/dL (01 Mar 2022 12:23)  POCT Blood Glucose.: 199 mg/dL (01 Mar 2022 10:34)      PHYSICAL EXAM  General: NAD, sitting comfortably in bed   HEENT: PERRL/ EOMI, no scleral icterus, MMM  Neck: Supple, no JVD  Respiratory: lungs CTA b/l, no wheezes/crackles, no accessory muscle use  Cardiovascular: Regular rhythm/rate; +S1 +S2, no murmurs  Gastrointestinal: Soft, NTND, normoactive BS, no rebound, no guarding  Genitourinary: no suprapubic tenderness  Extremities: WWP, no cyanosis, no clubbing, no edema, pulses equal  Neurological: A&Ox3, no gross focal deficits, follows commands  Skin: Normal temperature, warm, dry    MEDICATIONS  (STANDING):  dexAMETHasone     Tablet 6 milliGRAM(s) Oral daily  dextrose 40% Gel 15 Gram(s) Oral once  dextrose 5%. 1000 milliLiter(s) (50 mL/Hr) IV Continuous <Continuous>  dextrose 5%. 1000 milliLiter(s) (100 mL/Hr) IV Continuous <Continuous>  dextrose 50% Injectable 25 Gram(s) IV Push once  dextrose 50% Injectable 12.5 Gram(s) IV Push once  dextrose 50% Injectable 25 Gram(s) IV Push once  enoxaparin Injectable 40 milliGRAM(s) SubCutaneous every 12 hours  fentaNYL   Patch 100 MICROgram(s)/Hr 1 Patch Transdermal every 72 hours  glucagon  Injectable 1 milliGRAM(s) IntraMuscular once  influenza   Vaccine 0.5 milliLiter(s) IntraMuscular once  insulin lispro (ADMELOG) corrective regimen sliding scale   SubCutaneous Before meals and at bedtime  levETIRAcetam 500 milliGRAM(s) Oral every 12 hours  pantoprazole    Tablet 40 milliGRAM(s) Oral before breakfast  polyethylene glycol 3350 17 Gram(s) Oral daily  senna 2 Tablet(s) Oral at bedtime    MEDICATIONS  (PRN):  HYDROmorphone  Injectable 1 milliGRAM(s) IV Push every 4 hours PRN Severe Pain (7 - 10)  ondansetron Injectable 4 milliGRAM(s) IV Push every 6 hours PRN Nausea and/or Vomiting  oxyCODONE    IR 10 milliGRAM(s) Oral every 4 hours PRN Mild Pain (1 - 3)  oxyCODONE    IR 20 milliGRAM(s) Oral every 4 hours PRN Moderate Pain (4 - 6)      No Known Allergies      LABS                        13.9   13.57 )-----------( 306      ( 02 Mar 2022 07:45 )             42.8     03-02    136  |  102  |  21  ----------------------------<  183<H>  4.0   |  20<L>  |  0.63    Ca    9.0      02 Mar 2022 07:45  Phos  3.0     03-02  Mg     2.2     03-02    TPro  7.4  /  Alb  3.9  /  TBili  0.3  /  DBili  x   /  AST  30  /  ALT  29  /  AlkPhos  59      PT/INR - ( 2022 11:37 )   PT: 13.6 sec;   INR: 1.14          PTT - ( 2022 11:37 )  PTT:29.8 sec  Urinalysis Basic - ( 2022 13:47 )    Color: Yellow / Appearance: Clear / S.020 / pH: x  Gluc: x / Ketone: Trace mg/dL  / Bili: Negative / Urobili: 0.2 E.U./dL   Blood: x / Protein: 100 mg/dL / Nitrite: NEGATIVE   Leuk Esterase: Trace / RBC: < 5 /HPF / WBC < 5 /HPF   Sq Epi: x / Non Sq Epi: 0-5 /HPF / Bacteria: Present /HPF            RADIOLOGY & ADDITIONAL TESTS: Reviewed *INCOMPLETE NOTE*      INTERVAL HPI/OVERNIGHT EVENTS:  As per night team, no overnight events. Patient seen and examined at bedside. Patient no complaints at this time.     VITALS  Vital Signs Last 24 Hrs  T(C): 36.8 (02 Mar 2022 06:13), Max: 37 (01 Mar 2022 13:10)  T(F): 98.3 (02 Mar 2022 06:13), Max: 98.6 (01 Mar 2022 13:10)  HR: 99 (02 Mar 2022 06:13) (99 - 102)  BP: 123/79 (02 Mar 2022 06:13) (123/79 - 146/75)  BP(mean): --  RR: 18 (02 Mar 2022 06:13) (18 - 19)  SpO2: 94% (02 Mar 2022 06:13) (91% - 94%)    CAPILLARY BLOOD GLUCOSE      POCT Blood Glucose.: 144 mg/dL (02 Mar 2022 08:37)  POCT Blood Glucose.: 140 mg/dL (01 Mar 2022 22:03)  POCT Blood Glucose.: 156 mg/dL (01 Mar 2022 17:20)  POCT Blood Glucose.: 191 mg/dL (01 Mar 2022 12:23)  POCT Blood Glucose.: 199 mg/dL (01 Mar 2022 10:34)      PHYSICAL EXAM  General: NAD, sitting comfortably in bed, obese F  HEENT: PERRL/ EOMI, no scleral icterus, MMM  Neck: Supple, no JVD  Respiratory: lungs CTA b/l, slight decr BS at RLL but limited by body habitus, no wheezes/crackles, no accessory muscle use  Cardiovascular: Regular rhythm/rate; +S1 +S2, no murmurs  Gastrointestinal: Soft, NTND, normoactive BS, no rebound, no guarding  Extremities: WWP, no cyanosis, no clubbing, no edema, pulses equal  Neurological: A&Ox3, no gross focal deficits, follows commands  Skin: Normal temperature, warm, dry      MEDICATIONS  (STANDING):  dexAMETHasone     Tablet 6 milliGRAM(s) Oral daily  dextrose 40% Gel 15 Gram(s) Oral once  dextrose 5%. 1000 milliLiter(s) (50 mL/Hr) IV Continuous <Continuous>  dextrose 5%. 1000 milliLiter(s) (100 mL/Hr) IV Continuous <Continuous>  dextrose 50% Injectable 25 Gram(s) IV Push once  dextrose 50% Injectable 12.5 Gram(s) IV Push once  dextrose 50% Injectable 25 Gram(s) IV Push once  enoxaparin Injectable 40 milliGRAM(s) SubCutaneous every 12 hours  fentaNYL   Patch 100 MICROgram(s)/Hr 1 Patch Transdermal every 72 hours  glucagon  Injectable 1 milliGRAM(s) IntraMuscular once  influenza   Vaccine 0.5 milliLiter(s) IntraMuscular once  insulin lispro (ADMELOG) corrective regimen sliding scale   SubCutaneous Before meals and at bedtime  levETIRAcetam 500 milliGRAM(s) Oral every 12 hours  pantoprazole    Tablet 40 milliGRAM(s) Oral before breakfast  polyethylene glycol 3350 17 Gram(s) Oral daily  senna 2 Tablet(s) Oral at bedtime    MEDICATIONS  (PRN):  HYDROmorphone  Injectable 1 milliGRAM(s) IV Push every 4 hours PRN Severe Pain (7 - 10)  ondansetron Injectable 4 milliGRAM(s) IV Push every 6 hours PRN Nausea and/or Vomiting  oxyCODONE    IR 10 milliGRAM(s) Oral every 4 hours PRN Mild Pain (1 - 3)  oxyCODONE    IR 20 milliGRAM(s) Oral every 4 hours PRN Moderate Pain (4 - 6)      No Known Allergies      LABS                        13.9   13.57 )-----------( 306      ( 02 Mar 2022 07:45 )             42.8     03-02    136  |  102  |  21  ----------------------------<  183<H>  4.0   |  20<L>  |  0.63    Ca    9.0      02 Mar 2022 07:45  Phos  3.0     03-02  Mg     2.2     03-02    TPro  7.4  /  Alb  3.9  /  TBili  0.3  /  DBili  x   /  AST  30  /  ALT  29  /  AlkPhos  59      PT/INR - ( 2022 11:37 )   PT: 13.6 sec;   INR: 1.14          PTT - ( 2022 11:37 )  PTT:29.8 sec  Urinalysis Basic - ( 2022 13:47 )    Color: Yellow / Appearance: Clear / S.020 / pH: x  Gluc: x / Ketone: Trace mg/dL  / Bili: Negative / Urobili: 0.2 E.U./dL   Blood: x / Protein: 100 mg/dL / Nitrite: NEGATIVE   Leuk Esterase: Trace / RBC: < 5 /HPF / WBC < 5 /HPF   Sq Epi: x / Non Sq Epi: 0-5 /HPF / Bacteria: Present /HPF            RADIOLOGY & ADDITIONAL TESTS: Reviewed     INTERVAL HPI/OVERNIGHT EVENTS:  As per night team, no overnight events. Patient seen and examined at bedside. Patient no complaints at this time.     VITALS  Vital Signs Last 24 Hrs  T(C): 36.8 (02 Mar 2022 06:13), Max: 37 (01 Mar 2022 13:10)  T(F): 98.3 (02 Mar 2022 06:13), Max: 98.6 (01 Mar 2022 13:10)  HR: 99 (02 Mar 2022 06:13) (99 - 102)  BP: 123/79 (02 Mar 2022 06:13) (123/79 - 146/75)  BP(mean): --  RR: 18 (02 Mar 2022 06:13) (18 - 19)  SpO2: 94% (02 Mar 2022 06:13) (91% - 94%)    CAPILLARY BLOOD GLUCOSE      POCT Blood Glucose.: 144 mg/dL (02 Mar 2022 08:37)  POCT Blood Glucose.: 140 mg/dL (01 Mar 2022 22:03)  POCT Blood Glucose.: 156 mg/dL (01 Mar 2022 17:20)  POCT Blood Glucose.: 191 mg/dL (01 Mar 2022 12:23)  POCT Blood Glucose.: 199 mg/dL (01 Mar 2022 10:34)      PHYSICAL EXAM  General: NAD, sitting comfortably in bed, obese F  HEENT: PERRL/ EOMI, no scleral icterus, MMM  Neck: Supple, no JVD  Respiratory: lungs CTA b/l, slight decr BS at RLL but limited by body habitus, no wheezes/crackles, no accessory muscle use  Cardiovascular: Regular rhythm/rate; +S1 +S2, no murmurs  Gastrointestinal: Soft, NTND, normoactive BS, no rebound, no guarding  Extremities: WWP, no cyanosis, no clubbing, no edema, pulses equal  Neurological: A&Ox3, no gross focal deficits, follows commands  Skin: Normal temperature, warm, dry      MEDICATIONS  (STANDING):  dexAMETHasone     Tablet 6 milliGRAM(s) Oral daily  dextrose 40% Gel 15 Gram(s) Oral once  dextrose 5%. 1000 milliLiter(s) (50 mL/Hr) IV Continuous <Continuous>  dextrose 5%. 1000 milliLiter(s) (100 mL/Hr) IV Continuous <Continuous>  dextrose 50% Injectable 25 Gram(s) IV Push once  dextrose 50% Injectable 12.5 Gram(s) IV Push once  dextrose 50% Injectable 25 Gram(s) IV Push once  enoxaparin Injectable 40 milliGRAM(s) SubCutaneous every 12 hours  fentaNYL   Patch 100 MICROgram(s)/Hr 1 Patch Transdermal every 72 hours  glucagon  Injectable 1 milliGRAM(s) IntraMuscular once  influenza   Vaccine 0.5 milliLiter(s) IntraMuscular once  insulin lispro (ADMELOG) corrective regimen sliding scale   SubCutaneous Before meals and at bedtime  levETIRAcetam 500 milliGRAM(s) Oral every 12 hours  pantoprazole    Tablet 40 milliGRAM(s) Oral before breakfast  polyethylene glycol 3350 17 Gram(s) Oral daily  senna 2 Tablet(s) Oral at bedtime    MEDICATIONS  (PRN):  HYDROmorphone  Injectable 1 milliGRAM(s) IV Push every 4 hours PRN Severe Pain (7 - 10)  ondansetron Injectable 4 milliGRAM(s) IV Push every 6 hours PRN Nausea and/or Vomiting  oxyCODONE    IR 10 milliGRAM(s) Oral every 4 hours PRN Mild Pain (1 - 3)  oxyCODONE    IR 20 milliGRAM(s) Oral every 4 hours PRN Moderate Pain (4 - 6)      No Known Allergies      LABS                        13.9   13.57 )-----------( 306      ( 02 Mar 2022 07:45 )             42.8     03-02    136  |  102  |  21  ----------------------------<  183<H>  4.0   |  20<L>  |  0.63    Ca    9.0      02 Mar 2022 07:45  Phos  3.0     03-02  Mg     2.2     03-02    TPro  7.4  /  Alb  3.9  /  TBili  0.3  /  DBili  x   /  AST  30  /  ALT  29  /  AlkPhos  59  02-28    PT/INR - ( 2022 11:37 )   PT: 13.6 sec;   INR: 1.14          PTT - ( 2022 11:37 )  PTT:29.8 sec  Urinalysis Basic - ( 2022 13:47 )    Color: Yellow / Appearance: Clear / S.020 / pH: x  Gluc: x / Ketone: Trace mg/dL  / Bili: Negative / Urobili: 0.2 E.U./dL   Blood: x / Protein: 100 mg/dL / Nitrite: NEGATIVE   Leuk Esterase: Trace / RBC: < 5 /HPF / WBC < 5 /HPF   Sq Epi: x / Non Sq Epi: 0-5 /HPF / Bacteria: Present /HPF            RADIOLOGY & ADDITIONAL TESTS: Reviewed

## 2022-03-02 NOTE — DISCHARGE NOTE PROVIDER - NSDCCPCAREPLAN_GEN_ALL_CORE_FT
PRINCIPAL DISCHARGE DIAGNOSIS  Diagnosis: Lung cancer  Assessment and Plan of Treatment: You were previously diagnosed with stage 4 lung cancer. You previously underwent chemotherapy. You will now return home with home hospice.

## 2022-03-02 NOTE — DISCHARGE NOTE PROVIDER - HOSPITAL COURSE
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    Hospital course (by problem):     #stage 4 lung cancer: Pt diagnosed with s with Stage IV squamos cell cancer. S/p chemotherapy (she got chemo 3 x week then would have a 2 week off period x3 9 cycles over 3 months. Dr. Costa performed pet scan that showed no improvement and then offered patient alternative tx 2 weeks ago with immediate side effects and could not tolerate the chemo. She decided she no longer wants to pursue anymore aggressive therapy or interventions. Now for hospice.     Patient was discharged to: Hospice     New medications:   Changes to old medications:  Medications that were stopped:    Items to follow up as outpatient:     Physical exam at the time of discharge:  General: NAD, sitting comfortably in bed, obese F  HEENT: PERRL/ EOMI, no scleral icterus, MMM  Neck: Supple, no JVD  Respiratory: lungs CTA b/l, slight decr BS at RLL but limited by body habitus, no wheezes/crackles, no accessory muscle use  Cardiovascular: Regular rhythm/rate; +S1 +S2, no murmurs  Gastrointestinal: Soft, NTND, normoactive BS, no rebound, no guarding  Extremities: WWP, no cyanosis, no clubbing, no edema, pulses equal  Neurological: A&Ox3, no gross focal deficits, follows commands  Skin: Normal temperature, warm, dry #Discharge: do not delete    Patient is a 67y/o F w/ Hx of 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    Hospital course (by problem):     #stage 4 lung cancer: Pt diagnosed with s with Stage IV squamos cell cancer. S/p chemotherapy (she got chemo 3 x week then would have a 2 week off period x3 9 cycles over 3 months. Dr. Costa performed pet scan that showed no improvement and then offered patient alternative tx 2 weeks ago with immediate side effects and could not tolerate the chemo. She decided she no longer wants to pursue anymore aggressive therapy or interventions. Now for hospice.     Patient was discharged to: Hospice     New medications: oxy 10 q4hr PRN for moderate pain, oxy 20 oxy q4hr PRN for severe pain   Changes to old medications:  Medications that were stopped:      Physical exam at the time of discharge:  General: NAD, sitting comfortably in bed, obese F  HEENT: PERRL/ EOMI, no scleral icterus, MMM  Neck: Supple, no JVD  Respiratory: lungs CTA b/l, slight decr BS at RLL but limited by body habitus, no wheezes/crackles, no accessory muscle use  Cardiovascular: Regular rhythm/rate; +S1 +S2, no murmurs  Gastrointestinal: Soft, NTND, normoactive BS, no rebound, no guarding  Extremities: WWP, no cyanosis, no clubbing, no edema, pulses equal  Neurological: A&Ox3, no gross focal deficits, follows commands  Skin: Normal temperature, warm, dry

## 2022-03-02 NOTE — PROGRESS NOTE ADULT - SUBJECTIVE AND OBJECTIVE BOX
EVAN MONTOYA  MRN-[id number]      Interim History  Patient seen and examined.  She denies acute complaints this a.m.  Denies significant pain, nausea, vomiting, fevers, chills, constipation or diarrhea.    HPI:    66 y.o woman with COPD on home O2 found to have esSCLC with mediastinal, CNS, osseous metastases.  Diagnosed at [facility] 10/2021, started on carboplatin/etoposide/durvalumab with Xgenva 11/2021, delays to completion due to hospitalization for PNA 12/2021.  Radiation consulation was held for CNS Dz 11/19/2021 but patient declined at that time.      Following completion of chemotherapy and immunotherapy 2/7/2022, imaging showed progression of disease second line paclitaxel was initiated.  Patient has only received one cycle of this prior to functional decline.  She has pain in neck related to osseous metastases, headaches, cough.  Palliative care (Dr. Pacheco) has been following as well as an outpatient.    Due to confusion, further fatigue, and inability to cope at home, patient was admitted yesterday.    Today her primary complaints are pain in neck and headache. She denies other pain, nausea, vomiting, poor appetite      PAST MEDICAL & SURGICAL HISTORY:  Hypertension    Stage 4 lung cancer    Chronic obstructive pulmonary disease (COPD)    Emphysema of lung        MEDICATIONS  (STANDING):  albuterol/ipratropium for Nebulization 3 milliLiter(s) Nebulizer every 6 hours  dexAMETHasone     Tablet 6 milliGRAM(s) Oral daily  dextrose 40% Gel 15 Gram(s) Oral once  dextrose 5%. 1000 milliLiter(s) IV Continuous <Continuous>  dextrose 5%. 1000 milliLiter(s) IV Continuous <Continuous>  dextrose 50% Injectable 25 Gram(s) IV Push once  dextrose 50% Injectable 12.5 Gram(s) IV Push once  dextrose 50% Injectable 25 Gram(s) IV Push once  enoxaparin Injectable 40 milliGRAM(s) SubCutaneous every 12 hours  fentaNYL   Patch 100 MICROgram(s)/Hr 1 Patch Transdermal every 72 hours  glucagon  Injectable 1 milliGRAM(s) IntraMuscular once  influenza   Vaccine 0.5 milliLiter(s) IntraMuscular once  insulin lispro (ADMELOG) corrective regimen sliding scale   SubCutaneous Before meals and at bedtime  levETIRAcetam 500 milliGRAM(s) Oral every 12 hours  pantoprazole    Tablet 40 milliGRAM(s) Oral before breakfast  polyethylene glycol 3350 17 Gram(s) Oral daily  senna 2 Tablet(s) Oral at bedtime      Allergies    No Known Allergies    Intolerances          FAMILY HISTORY:      ROS:  The patient denies chest pain.  No nausea/vomiting/fevers/chills/night sweats.    +fatigue, +headaches/dizziness.    Appetite is stable  No abdominal pain/diarrhea/constipation.  No melena or hematochezia.    No dysuria/hematuria.  No history of easy bruising/bleeding.  No gingival bleeding or epistaxis.  No leg pain or leg swelling.    ROS is otherwise negative.    Physical exam:    Vital signs  Vital Signs Last 24 Hrs  T(C): 36.8 (02 Mar 2022 06:13), Max: 37 (01 Mar 2022 13:10)  T(F): 98.3 (02 Mar 2022 06:13), Max: 98.6 (01 Mar 2022 13:10)  HR: 99 (02 Mar 2022 06:13) (99 - 102)  BP: 123/79 (02 Mar 2022 06:13) (123/79 - 146/75)  BP(mean): --  RR: 18 (02 Mar 2022 06:13) (18 - 19)  SpO2: 94% (02 Mar 2022 06:13) (91% - 94%)  HEENT: PERRLA, pink mucosae  Cardiovascular: no murmurs, rubs, gallops  Respiratory: clear to asucultation B/L  Abdomen: soft, non tender, non distended, no palpable masses,  Extremities:  anasarca  Neuro: alert, awake and oriented x 3, no focal abnormalities  Skin: warm, no obvious lesions on visible skin    LABS:                        13.9   13.57 )-----------( 306      ( 02 Mar 2022 07:45 )             42.8         CBC Full  -  ( 02 Mar 2022 07:45 )  WBC Count : 13.57 K/uL  RBC Count : 4.59 M/uL  Hemoglobin : 13.9 g/dL  Hematocrit : 42.8 %  Platelet Count - Automated : 306 K/uL  Mean Cell Volume : 93.2 fl  Mean Cell Hemoglobin : 30.3 pg  Mean Cell Hemoglobin Concentration : 32.5 gm/dL  Auto Neutrophil # : 9.35 K/uL  Auto Lymphocyte # : 2.86 K/uL  Auto Monocyte # : 1.26 K/uL  Auto Eosinophil # : 0.01 K/uL  Auto Basophil # : 0.02 K/uL  Auto Neutrophil % : 68.9 %  Auto Lymphocyte % : 21.1 %  Auto Monocyte % : 9.3 %  Auto Eosinophil % : 0.1 %  Auto Basophil % : 0.1 %        03-02    136  |  102  |  x   ----------------------------<  183<H>  4.0   |  20<L>  |  0.63    Ca    9.0      02 Mar 2022 07:45  Phos  3.0     03-02  Mg     2.2     03-02    TPro  7.4  /  Alb  3.9  /  TBili  0.3  /  DBili  x   /  AST  30  /  ALT  29  /  AlkPhos  59  02-28        PT/INR - ( 28 Feb 2022 11:37 )   PT: 13.6 sec;   INR: 1.14          PTT - ( 28 Feb 2022 11:37 )  PTT:29.8 sec      PERTINENT IMAGING STUDIES: reviewed    ASSESSMENT:  66F with COPD and extensive stage small cell lung CA, involving brain and bone presenting with failure to thrive    PLAN    SCLC  Poor response to first line chemotherapy, and with further functional decline on second line paclitaxel  Patient is not a candidate for further chemotherapy  Appreciate radiation oncology input regarding for for palliative RT.  Given patient's functional status, there is minimal benefit in this setting  Patient with good symptom control at this time  Appreciate palliative care involvement, patient will have option of inpatient hospice for end of life care  - I will discuss with daughter later this morning.  Patient tells me her daughter is taking child to school and recommend that I call later in the morning or early afternoon.      Medical Oncology to remain available during this admission      Thank you    Ruel Burns MD for Vimal Tolliver MD  [phone]

## 2022-03-03 LAB
GLUCOSE BLDC GLUCOMTR-MCNC: 113 MG/DL — HIGH (ref 70–99)
GLUCOSE BLDC GLUCOMTR-MCNC: 148 MG/DL — HIGH (ref 70–99)
GLUCOSE BLDC GLUCOMTR-MCNC: 152 MG/DL — HIGH (ref 70–99)
GLUCOSE BLDC GLUCOMTR-MCNC: 185 MG/DL — HIGH (ref 70–99)

## 2022-03-03 PROCEDURE — 99233 SBSQ HOSP IP/OBS HIGH 50: CPT | Mod: GC

## 2022-03-03 RX ORDER — MORPHINE SULFATE 50 MG/1
2.5 CAPSULE, EXTENDED RELEASE ORAL
Qty: 15 | Refills: 0
Start: 2022-03-03

## 2022-03-03 RX ORDER — FENTANYL CITRATE 50 UG/ML
1 INJECTION INTRAVENOUS
Refills: 0 | Status: DISCONTINUED | OUTPATIENT
Start: 2022-03-03 | End: 2022-03-04

## 2022-03-03 RX ORDER — LEVETIRACETAM 250 MG/1
1 TABLET, FILM COATED ORAL
Qty: 0 | Refills: 0 | DISCHARGE
Start: 2022-03-03

## 2022-03-03 RX ORDER — SENNA PLUS 8.6 MG/1
2 TABLET ORAL
Qty: 0 | Refills: 0 | DISCHARGE
Start: 2022-03-03

## 2022-03-03 RX ORDER — POLYETHYLENE GLYCOL 3350 17 G/17G
17 POWDER, FOR SOLUTION ORAL
Qty: 0 | Refills: 0 | DISCHARGE
Start: 2022-03-03

## 2022-03-03 RX ADMIN — Medication 2: at 17:17

## 2022-03-03 RX ADMIN — LEVETIRACETAM 500 MILLIGRAM(S): 250 TABLET, FILM COATED ORAL at 17:17

## 2022-03-03 RX ADMIN — OXYCODONE HYDROCHLORIDE 10 MILLIGRAM(S): 5 TABLET ORAL at 15:05

## 2022-03-03 RX ADMIN — Medication 2: at 12:07

## 2022-03-03 RX ADMIN — LEVETIRACETAM 500 MILLIGRAM(S): 250 TABLET, FILM COATED ORAL at 05:22

## 2022-03-03 RX ADMIN — OXYCODONE HYDROCHLORIDE 20 MILLIGRAM(S): 5 TABLET ORAL at 21:46

## 2022-03-03 RX ADMIN — ENOXAPARIN SODIUM 40 MILLIGRAM(S): 100 INJECTION SUBCUTANEOUS at 23:59

## 2022-03-03 RX ADMIN — Medication 6 MILLIGRAM(S): at 05:22

## 2022-03-03 RX ADMIN — OXYCODONE HYDROCHLORIDE 10 MILLIGRAM(S): 5 TABLET ORAL at 03:26

## 2022-03-03 RX ADMIN — FENTANYL CITRATE 1 PATCH: 50 INJECTION INTRAVENOUS at 17:39

## 2022-03-03 RX ADMIN — OXYCODONE HYDROCHLORIDE 20 MILLIGRAM(S): 5 TABLET ORAL at 22:46

## 2022-03-03 RX ADMIN — OXYCODONE HYDROCHLORIDE 10 MILLIGRAM(S): 5 TABLET ORAL at 04:25

## 2022-03-03 RX ADMIN — Medication 1 APPLICATION(S): at 02:07

## 2022-03-03 RX ADMIN — Medication 3 MILLIGRAM(S): at 21:46

## 2022-03-03 RX ADMIN — ENOXAPARIN SODIUM 40 MILLIGRAM(S): 100 INJECTION SUBCUTANEOUS at 11:22

## 2022-03-03 RX ADMIN — FENTANYL CITRATE 1 PATCH: 50 INJECTION INTRAVENOUS at 16:25

## 2022-03-03 RX ADMIN — PANTOPRAZOLE SODIUM 40 MILLIGRAM(S): 20 TABLET, DELAYED RELEASE ORAL at 06:30

## 2022-03-03 RX ADMIN — OXYCODONE HYDROCHLORIDE 10 MILLIGRAM(S): 5 TABLET ORAL at 14:05

## 2022-03-03 NOTE — PROGRESS NOTE ADULT - PROBLEM SELECTOR PLAN 1
Patient diagnosed last Oct with Stage IV squamous cell carcinoma s/p 3 months of chemo (with Dr. Costa) per daughter with outpatient Pet SCan showing no improvement. Trialed on alternative regimen two weeks ago but could not tolerate. Patient with symptoms consistent with failure to thrive, and no longer wants any aggressive measures. CT scan this admission showing No significant interval change in bulky mediastinal and hilar lymphadenopathy with necrotic 5.2 cm right paratracheal lymph node and conglomerate mass/lymphadenopathy within the subcarinal region measuring 7.5 x 7.9 cm with narrowing and encasement of the bronchus intermedius and obstruction of both the right middle and right lower lobe ronchi. CT head-Extensive areas of vasogenic edema including in the right frontal lobe, left paramedian parietal lobe, and bilateral cerebellar hemispheres, findings concerning for intracranial metastatic disease in the setting of known malignancy.  - GOC - DNR/DNI  - c/w keppra 500 mg BID and decadron for seizure prophylaxia given brain mets  - oxy 10 q4hr PRN mod pain, oxy 20 q4hr severe pain, dilaudid 1mg IVP breakthrough pain  - zofran prn for nausea  - palliative following recs appreciated, pending home hospice
Patient diagnosed last Oct with Stage IV squamous cell carcinoma s/p 3 months of chemo (with Dr. Costa) per daughter with outpatient Pet SCan showing no improvement. Trialed on alternative regimen two weeks ago but could not tolerate. Patient with symptoms consistent with failure to thrive, and no longer wants any aggressive measures. CT scan this admission showing No significant interval change in bulky mediastinal and hilar lymphadenopathy with necrotic 5.2 cm right paratracheal lymph node and conglomerate mass/lymphadenopathy within the subcarinal region measuring 7.5 x 7.9 cm with narrowing and encasement of the bronchus intermedius and obstruction of both the right middle and right lower lobe ronchi. CT head-Extensive areas of vasogenic edema including in the right frontal lobe, left paramedian parietal lobe, and bilateral cerebellar hemispheres, findings concerning for intracranial metastatic disease in the setting of known malignancy.  - GOC - DNR/DNI, official comfort care pending pallcare discussion  - c/w keppra 500 mg BID and decadron for seizure prophylaxia given brain mets  - oxy 10 q4hr PRN mod pain, oxy 20 q4hr severe pain, dilaudid 1mg IVP breakthrough pain  - zofran prn for nausea  - palliative following recs appreciated
Patient diagnosed last Oct with Stage IV squamous cell carcinoma s/p 3 months of chemo (with Dr. Costa) per daughter with outpatient Pet SCan showing no improvement. Trialed on alternative regimen two weeks ago but could not tolerate. Patient with symptoms consistent with failure to thrive, and no longer wants any aggressive measures. CT scan this admission showing No significant interval change in bulky mediastinal and hilar lymphadenopathy with necrotic 5.2 cm right paratracheal lymph node and conglomerate mass/lymphadenopathy within the subcarinal region measuring 7.5 x 7.9 cm with narrowing and encasement of the bronchus intermedius and obstruction of both the right middle and right lower lobe ronchi. CT head-Extensive areas of vasogenic edema including in the right frontal lobe, left paramedian parietal lobe, and bilateral cerebellar hemispheres, findings concerning for intracranial metastatic disease in the setting of known malignancy.  - GOC - DNR/DNI, official comfort care pending pallcare discussion  - c/w keppra 500 mg BID and decadron for seizure prophylaxia given brain mets  - percocet for severe pain, breakthrough dilaudad ordered  - zofran prn for nausea  - f/u palliative recs

## 2022-03-03 NOTE — PROGRESS NOTE ADULT - PROBLEM SELECTOR PLAN 5
Patient with hx of being norvasc has stopped all medications since october. SBP in 130s-140s.   - continue to monitor pressures
Patient with hx of being norvasc has stopped all medications since october. SBP in 130s-140s.   - continue to monitor pressures
Patient with hx of being norvasc has stopped all medications since october. SBP in 130s-140s.   - continue to monitor pressures  - if hypertensive, can consider restarting norvasc 5 mg

## 2022-03-03 NOTE — PROGRESS NOTE ADULT - SUBJECTIVE AND OBJECTIVE BOX
EVAN MONTOYA  MRN-[id number]      Interim History  Patient seen and examined.  She is fatigued this a.m.  Planning for d/c home hospice tomorrow once supplies are in place.  She denies significant pain, nausea, vomiting    HPI:    66 y.o woman with COPD on home O2 found to have esSCLC with mediastinal, CNS, osseous metastases.  Diagnosed at [facility] 10/2021, started on carboplatin/etoposide/durvalumab with Xgenva 11/2021, delays to completion due to hospitalization for PNA 12/2021.  Radiation consulation was held for CNS Dz 11/19/2021 but patient declined at that time.      Following completion of chemotherapy and immunotherapy 2/7/2022, imaging showed progression of disease second line paclitaxel was initiated.  Patient has only received one cycle of this prior to functional decline.  She has pain in neck related to osseous metastases, headaches, cough.  Palliative care (Dr. Pacheco) has been following as well as an outpatient.    Due to confusion, further fatigue, and inability to cope at home, patient was admitted yesterday.    Today her primary complaints are pain in neck and headache. She denies other pain, nausea, vomiting, poor appetite      PAST MEDICAL & SURGICAL HISTORY:  Hypertension    Stage 4 lung cancer    Chronic obstructive pulmonary disease (COPD)    Emphysema of lung        MEDICATIONS  (STANDING):  albuterol/ipratropium for Nebulization 3 milliLiter(s) Nebulizer every 6 hours  dexAMETHasone     Tablet 6 milliGRAM(s) Oral daily  dextrose 40% Gel 15 Gram(s) Oral once  dextrose 5%. 1000 milliLiter(s) IV Continuous <Continuous>  dextrose 5%. 1000 milliLiter(s) IV Continuous <Continuous>  dextrose 50% Injectable 25 Gram(s) IV Push once  dextrose 50% Injectable 12.5 Gram(s) IV Push once  dextrose 50% Injectable 25 Gram(s) IV Push once  enoxaparin Injectable 40 milliGRAM(s) SubCutaneous every 12 hours  fentaNYL   Patch 100 MICROgram(s)/Hr 1 Patch Transdermal every 72 hours  glucagon  Injectable 1 milliGRAM(s) IntraMuscular once  influenza   Vaccine 0.5 milliLiter(s) IntraMuscular once  insulin lispro (ADMELOG) corrective regimen sliding scale   SubCutaneous Before meals and at bedtime  levETIRAcetam 500 milliGRAM(s) Oral every 12 hours  pantoprazole    Tablet 40 milliGRAM(s) Oral before breakfast  polyethylene glycol 3350 17 Gram(s) Oral daily  senna 2 Tablet(s) Oral at bedtime      Allergies    No Known Allergies    Intolerances          FAMILY HISTORY:      ROS:  The patient denies chest pain.  No nausea/vomiting/fevers/chills/night sweats.    +fatigue, +headaches/dizziness.    Appetite is stable  No abdominal pain/diarrhea/constipation.  No melena or hematochezia.    No dysuria/hematuria.  No history of easy bruising/bleeding.  No gingival bleeding or epistaxis.  No leg pain or leg swelling.    ROS is otherwise negative.    Physical exam:    Vital signs  Vital Signs Last 24 Hrs  T(C): 37 (03 Mar 2022 06:15), Max: 37 (03 Mar 2022 06:15)  T(F): 98.6 (03 Mar 2022 06:15), Max: 98.6 (03 Mar 2022 06:15)  HR: 103 (03 Mar 2022 06:15) (102 - 103)  BP: 147/87 (03 Mar 2022 06:15) (111/73 - 147/87)  BP(mean): --  RR: 19 (03 Mar 2022 06:15) (18 - 19)  SpO2: 93% (03 Mar 2022 06:15) (90% - 93%)HEENT: PERRLA, pink mucosae  Cardiovascular: no murmurs, rubs, gallops  Respiratory: clear to asucultation B/L  Abdomen: soft, non tender, non distended, no palpable masses,  Extremities:  anasarca  Neuro: alert, awake and oriented x 3, no focal abnormalities  Skin: warm, no obvious lesions on visible skin    LABS:                                         13.9   13.57 )-----------( 306      ( 02 Mar 2022 07:45 )             42.8         CBC Full  -  ( 02 Mar 2022 07:45 )  WBC Count : 13.57 K/uL  RBC Count : 4.59 M/uL  Hemoglobin : 13.9 g/dL  Hematocrit : 42.8 %  Platelet Count - Automated : 306 K/uL  Mean Cell Volume : 93.2 fl  Mean Cell Hemoglobin : 30.3 pg  Mean Cell Hemoglobin Concentration : 32.5 gm/dL  Auto Neutrophil # : 9.35 K/uL  Auto Lymphocyte # : 2.86 K/uL  Auto Monocyte # : 1.26 K/uL  Auto Eosinophil # : 0.01 K/uL  Auto Basophil # : 0.02 K/uL  Auto Neutrophil % : 68.9 %  Auto Lymphocyte % : 21.1 %  Auto Monocyte % : 9.3 %  Auto Eosinophil % : 0.1 %  Auto Basophil % : 0.1 %        03-02    136  |  102  |  21  ----------------------------<  183<H>  4.0   |  20<L>  |  0.63    Ca    9.0      02 Mar 2022 07:45  Phos  3.0     03-02  Mg     2.2     03-02              PERTINENT IMAGING STUDIES: reviewed    ASSESSMENT:  66F with COPD and extensive stage small cell lung CA, involving brain and bone presenting with failure to thrive    PLAN    SCLC  Poor response to first line chemotherapy, and with further functional decline on second line paclitaxel  Patient is not a candidate for further chemotherapy  Appreciate radiation oncology input regarding for for palliative RT.  Given patient's functional status, there is minimal benefit in this setting  Patient with good symptom control at this time  Plan for d/c to home hospice with inpatient hospice as an option when needed  I spoke with daughter yesterday about the above, and she states understanding  suggest d/c of insulin sliding scale - patient requiring minimal insulin      Medical Oncology to remain available during this admission      Thank you    Ruel Burns MD for Vimal Tolliver MD  [phone]

## 2022-03-03 NOTE — PROGRESS NOTE ADULT - ASSESSMENT
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 Patient admitted for further management and palliative assessment.
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 Patient admitted for further management and palliative assessment.
65 y/o F w/ Hx of stage IV squamous cell lung CA w/ mets to brain, liver, and lymph nodes, on chemo, HTN, HLD, COPD, emphysema on home O2 who presents with symptoms consistent with failure to thrive in setting of Ca and recent chemotherapy. Patient admitted for further management and palliative assessment.

## 2022-03-03 NOTE — PROGRESS NOTE ADULT - PROBLEM SELECTOR PLAN 3
see above  - c/w keppra and decadron for seizure prophylaxis

## 2022-03-03 NOTE — PROGRESS NOTE ADULT - PROBLEM SELECTOR PLAN 6
Patient does not want pill burden  - hold lipitor 20 mg, possible comfort care pending pallcare discussion

## 2022-03-03 NOTE — PROGRESS NOTE ADULT - SUBJECTIVE AND OBJECTIVE BOX
INTERVAL HPI/OVERNIGHT EVENTS:  As per night team, no overnight events. Patient seen and examined at bedside. Patient denies fever, chills, V/D. Reports going to the bathroom ok. No complaints.         VITALS  Vital Signs Last 24 Hrs  T(C): 37 (03 Mar 2022 06:15), Max: 37 (03 Mar 2022 06:15)  T(F): 98.6 (03 Mar 2022 06:15), Max: 98.6 (03 Mar 2022 06:15)  HR: 103 (03 Mar 2022 06:15) (102 - 103)  BP: 147/87 (03 Mar 2022 06:15) (111/73 - 147/87)  BP(mean): --  RR: 19 (03 Mar 2022 06:15) (18 - 19)  SpO2: 93% (03 Mar 2022 06:15) (90% - 93%)    CAPILLARY BLOOD GLUCOSE      POCT Blood Glucose.: 121 mg/dL (02 Mar 2022 23:37)  POCT Blood Glucose.: 157 mg/dL (02 Mar 2022 18:08)  POCT Blood Glucose.: 186 mg/dL (02 Mar 2022 11:37)  POCT Blood Glucose.: 144 mg/dL (02 Mar 2022 08:37)      PHYSICAL EXAM  General: NAD, sitting comfortably in bed, obese F  HEENT: PERRL/ EOMI, no scleral icterus, MMM  Neck: Supple, no JVD  Respiratory: lungs CTA b/l, slight decr BS at RLL but limited by body habitus, no wheezes/crackles, no accessory muscle use  Cardiovascular: Regular rhythm/rate; +S1 +S2, no murmurs  Gastrointestinal: Soft, NTND, normoactive BS, no rebound, no guarding  Extremities: WWP, no cyanosis, no clubbing, no edema, pulses equal  Neurological: A&Ox3, no gross focal deficits, follows commands  Skin: Normal temperature, warm, dry      MEDICATIONS  (STANDING):  dexAMETHasone     Tablet 6 milliGRAM(s) Oral daily  dextrose 40% Gel 15 Gram(s) Oral once  dextrose 5%. 1000 milliLiter(s) (50 mL/Hr) IV Continuous <Continuous>  dextrose 5%. 1000 milliLiter(s) (100 mL/Hr) IV Continuous <Continuous>  dextrose 50% Injectable 25 Gram(s) IV Push once  dextrose 50% Injectable 12.5 Gram(s) IV Push once  dextrose 50% Injectable 25 Gram(s) IV Push once  enoxaparin Injectable 40 milliGRAM(s) SubCutaneous every 12 hours  fentaNYL   Patch 100 MICROgram(s)/Hr 1 Patch Transdermal every 72 hours  glucagon  Injectable 1 milliGRAM(s) IntraMuscular once  influenza   Vaccine 0.5 milliLiter(s) IntraMuscular once  insulin lispro (ADMELOG) corrective regimen sliding scale   SubCutaneous Before meals and at bedtime  levETIRAcetam 500 milliGRAM(s) Oral every 12 hours  melatonin 3 milliGRAM(s) Oral at bedtime  pantoprazole    Tablet 40 milliGRAM(s) Oral before breakfast  polyethylene glycol 3350 17 Gram(s) Oral daily  senna 2 Tablet(s) Oral at bedtime    MEDICATIONS  (PRN):  HYDROmorphone  Injectable 1 milliGRAM(s) IV Push every 4 hours PRN Severe Pain (7 - 10)  ondansetron Injectable 4 milliGRAM(s) IV Push every 6 hours PRN Nausea and/or Vomiting  oxyCODONE    IR 10 milliGRAM(s) Oral every 4 hours PRN Mild Pain (1 - 3)  oxyCODONE    IR 20 milliGRAM(s) Oral every 4 hours PRN Moderate Pain (4 - 6)      No Known Allergies      LABS                        13.9   13.57 )-----------( 306      ( 02 Mar 2022 07:45 )             42.8     03-02    136  |  102  |  21  ----------------------------<  183<H>  4.0   |  20<L>  |  0.63    Ca    9.0      02 Mar 2022 07:45  Phos  3.0     03-02  Mg     2.2     03-02                RADIOLOGY & ADDITIONAL TESTS: Reviewed

## 2022-03-03 NOTE — PROGRESS NOTE ADULT - PROBLEM SELECTOR PLAN 2
as above    #Postobstructive pneumonitis   CT chest showing the right lower lobe with encasement and obstruction of the bronchus intermedius, right middle and right lower lobe bronchi by confluent lymphadenopathy/mediastinal mass as above. Findings compatible with provided history of advanced stage small cell carcinoma. Patient with 1/4 SIRs on arrival. No concerns for overt infection. Immune response may be masked in setting of chemotherapy. However, patient with no fever, chills, worsened SOB, productive cough, or UTI like symtoms that would concern for infection.  -continue to monitor low threshold to start Abx if patient spikes temp
as above  -PT consult    #Postobstructive pneumonitis   CT chest showing the right lower lobe with encasement and obstruction of the bronchus intermedius, right middle and right lower lobe bronchi by confluent lymphadenopathy/mediastinal mass as above. Findings compatible with provided history of advanced stage small cell carcinoma. Patient with 1/4 SIRs on arrival. No concerns for overt infection. Immune response may be masked in setting of chemotherapy. However, patient with no fever, chills, worsened SOB, productive cough, or UTI like symtoms that would concern for infection.  -continue to monitor low threshold to start Abx if patient spikes temp
as above  -PT consult    #Postobstructive pneumonitis   CT chest showing the right lower lobe with encasement and obstruction of the bronchus intermedius, right middle and right lower lobe bronchi by confluent lymphadenopathy/mediastinal mass as above. Findings compatible with provided history of advanced stage small cell carcinoma. Patient with 1/4 SIRs on arrival. No concerns for overt infection. Immune response may be masked in setting of chemotherapy. However, patient with no fever, chills, worsened SOB, productive cough, or UTI like symtoms that would concern for infection.  -continue to monitor low threshold to start Abx if patient spikes temp

## 2022-03-03 NOTE — PROGRESS NOTE ADULT - PROBLEM SELECTOR PLAN 7
hx of wellbutrin and lexapro. avoiding pill burden

## 2022-03-03 NOTE — PROGRESS NOTE ADULT - PROBLEM SELECTOR PLAN 4
Last written NP Lito Tran 10/2019.
Patient with extensive smoking history. Per daughter she is on 4-5 L at home. However, satting 96% on 2 L here. No inc in sputum or cough. Not in COPD exacerbation  - goal 02 sat >88%  - c/w standing hanny

## 2022-03-03 NOTE — PROGRESS NOTE ADULT - ATTENDING COMMENTS
Patient was seen and examined with the resident team today.  I agree with Dr. 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  No longer deemed a candidate for chemo.  Now planning for home hospice.    #Stage IV squamous cell lung CA c/b brain and bone mets - DNR/DNI; home hospice planning  #Vasogenic edema - c/w Keppra and Decadron for seizure ppx  #DVT PPx - Lovenox   #Dispo - home hospice tomorrow    Gisel Krueger  [phone]
Patient was seen and examined with the resident team today.  I agree with Dr. 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  No longer deemed a candidate for chemo.  Patient and family requesting to learn more about hospice options.     #Stage IV squamous cell lung CA c/b brain and bone mets - DNR/DNI; [facility] to meet w/them this morning; c/w Fentanyl patch and titrate as needed, c/w IV Dilaudid and titrate as needed  #Vasogenic edema - c/w Keppra and Decadron for seizure ppx   #DVT PPx - Lovenox but can stop once family agrees to full comfort measures  #Dispo - TBD pending [facility] pauly Krueger  [phone]
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 Radiation oncology discussed the treatment option with the patient but given her prognosis, there will be no additional benefit of brain-directed RT. Currently pt is DNR/DNI with ongoing discussion regarding inpatient hospice.

## 2022-03-03 NOTE — PROGRESS NOTE ADULT - PROBLEM SELECTOR PLAN 8
Regular diet  replete lytes as needed  lovenox  no GI PPx  DNR DNI    Dispo: floors

## 2022-03-04 ENCOUNTER — TRANSCRIPTION ENCOUNTER (OUTPATIENT)
Age: 67
End: 2022-03-04

## 2022-03-04 VITALS
OXYGEN SATURATION: 93 % | RESPIRATION RATE: 18 BRPM | HEART RATE: 97 BPM | SYSTOLIC BLOOD PRESSURE: 134 MMHG | DIASTOLIC BLOOD PRESSURE: 89 MMHG | TEMPERATURE: 98 F

## 2022-03-04 LAB — GLUCOSE BLDC GLUCOMTR-MCNC: 154 MG/DL — HIGH (ref 70–99)

## 2022-03-04 PROCEDURE — 96361 HYDRATE IV INFUSION ADD-ON: CPT

## 2022-03-04 PROCEDURE — 81001 URINALYSIS AUTO W/SCOPE: CPT

## 2022-03-04 PROCEDURE — 99239 HOSP IP/OBS DSCHRG MGMT >30: CPT

## 2022-03-04 PROCEDURE — 82962 GLUCOSE BLOOD TEST: CPT

## 2022-03-04 PROCEDURE — 87040 BLOOD CULTURE FOR BACTERIA: CPT

## 2022-03-04 PROCEDURE — 85025 COMPLETE CBC W/AUTO DIFF WBC: CPT

## 2022-03-04 PROCEDURE — 93005 ELECTROCARDIOGRAM TRACING: CPT

## 2022-03-04 PROCEDURE — U0003: CPT

## 2022-03-04 PROCEDURE — 36415 COLL VENOUS BLD VENIPUNCTURE: CPT

## 2022-03-04 PROCEDURE — 86803 HEPATITIS C AB TEST: CPT

## 2022-03-04 PROCEDURE — 94640 AIRWAY INHALATION TREATMENT: CPT

## 2022-03-04 PROCEDURE — 99285 EMERGENCY DEPT VISIT HI MDM: CPT

## 2022-03-04 PROCEDURE — 71045 X-RAY EXAM CHEST 1 VIEW: CPT

## 2022-03-04 PROCEDURE — U0005: CPT

## 2022-03-04 PROCEDURE — 80048 BASIC METABOLIC PNL TOTAL CA: CPT

## 2022-03-04 PROCEDURE — 71275 CT ANGIOGRAPHY CHEST: CPT | Mod: MA

## 2022-03-04 PROCEDURE — 96374 THER/PROPH/DIAG INJ IV PUSH: CPT

## 2022-03-04 PROCEDURE — 70450 CT HEAD/BRAIN W/O DYE: CPT | Mod: MA

## 2022-03-04 PROCEDURE — 85610 PROTHROMBIN TIME: CPT

## 2022-03-04 PROCEDURE — 83735 ASSAY OF MAGNESIUM: CPT

## 2022-03-04 PROCEDURE — 84100 ASSAY OF PHOSPHORUS: CPT

## 2022-03-04 PROCEDURE — 85730 THROMBOPLASTIN TIME PARTIAL: CPT

## 2022-03-04 PROCEDURE — 80053 COMPREHEN METABOLIC PANEL: CPT

## 2022-03-04 RX ADMIN — LEVETIRACETAM 500 MILLIGRAM(S): 250 TABLET, FILM COATED ORAL at 05:34

## 2022-03-04 RX ADMIN — Medication 6 MILLIGRAM(S): at 05:34

## 2022-03-04 RX ADMIN — FENTANYL CITRATE 1 PATCH: 50 INJECTION INTRAVENOUS at 06:39

## 2022-03-04 RX ADMIN — PANTOPRAZOLE SODIUM 40 MILLIGRAM(S): 20 TABLET, DELAYED RELEASE ORAL at 06:39

## 2022-03-04 RX ADMIN — OXYCODONE HYDROCHLORIDE 10 MILLIGRAM(S): 5 TABLET ORAL at 05:40

## 2022-03-04 RX ADMIN — OXYCODONE HYDROCHLORIDE 10 MILLIGRAM(S): 5 TABLET ORAL at 06:40

## 2022-03-04 RX ADMIN — FENTANYL CITRATE 1 PATCH: 50 INJECTION INTRAVENOUS at 08:54

## 2022-03-04 NOTE — DISCHARGE NOTE NURSING/CASE MANAGEMENT/SOCIAL WORK - PATIENT PORTAL LINK FT
You can access the FollowMyHealth Patient Portal offered by [facility] by registering at the following website: [email or web address]. By joining [employer]’s FollowMyHealth portal, you will also be able to view your health information using other applications (apps) compatible with our system.

## 2022-03-04 NOTE — DISCHARGE NOTE NURSING/CASE MANAGEMENT/SOCIAL WORK - NSDCPEFALRISK_GEN_ALL_CORE
For information on Fall & Injury Prevention, visit: [email or web address] OR  [email or web address] OR  https://www.cdc.gov/steadi/patient.html

## 2022-03-06 LAB
CULTURE RESULTS: SIGNIFICANT CHANGE UP
CULTURE RESULTS: SIGNIFICANT CHANGE UP
SPECIMEN SOURCE: SIGNIFICANT CHANGE UP
SPECIMEN SOURCE: SIGNIFICANT CHANGE UP

## 2022-03-07 DIAGNOSIS — C77.9 SECONDARY AND UNSPECIFIED MALIGNANT NEOPLASM OF LYMPH NODE, UNSPECIFIED: ICD-10-CM

## 2022-03-07 DIAGNOSIS — R11.0 NAUSEA: ICD-10-CM

## 2022-03-07 DIAGNOSIS — Z92.21 PERSONAL HISTORY OF ANTINEOPLASTIC CHEMOTHERAPY: ICD-10-CM

## 2022-03-07 DIAGNOSIS — Z99.81 DEPENDENCE ON SUPPLEMENTAL OXYGEN: ICD-10-CM

## 2022-03-07 DIAGNOSIS — C79.51 SECONDARY MALIGNANT NEOPLASM OF BONE: ICD-10-CM

## 2022-03-07 DIAGNOSIS — J43.9 EMPHYSEMA, UNSPECIFIED: ICD-10-CM

## 2022-03-07 DIAGNOSIS — F17.210 NICOTINE DEPENDENCE, CIGARETTES, UNCOMPLICATED: ICD-10-CM

## 2022-03-07 DIAGNOSIS — C34.90 MALIGNANT NEOPLASM OF UNSPECIFIED PART OF UNSPECIFIED BRONCHUS OR LUNG: ICD-10-CM

## 2022-03-07 DIAGNOSIS — G93.6 CEREBRAL EDEMA: ICD-10-CM

## 2022-03-07 DIAGNOSIS — E78.5 HYPERLIPIDEMIA, UNSPECIFIED: ICD-10-CM

## 2022-03-07 DIAGNOSIS — M54.9 DORSALGIA, UNSPECIFIED: ICD-10-CM

## 2022-03-07 DIAGNOSIS — R62.7 ADULT FAILURE TO THRIVE: ICD-10-CM

## 2022-03-07 DIAGNOSIS — I10 ESSENTIAL (PRIMARY) HYPERTENSION: ICD-10-CM

## 2022-03-07 DIAGNOSIS — J18.9 PNEUMONIA, UNSPECIFIED ORGANISM: ICD-10-CM

## 2022-03-07 DIAGNOSIS — C79.31 SECONDARY MALIGNANT NEOPLASM OF BRAIN: ICD-10-CM

## 2022-03-07 DIAGNOSIS — R00.0 TACHYCARDIA, UNSPECIFIED: ICD-10-CM

## 2022-03-07 DIAGNOSIS — Z51.5 ENCOUNTER FOR PALLIATIVE CARE: ICD-10-CM

## 2022-03-07 DIAGNOSIS — Z66 DO NOT RESUSCITATE: ICD-10-CM

## 2022-03-07 DIAGNOSIS — K59.00 CONSTIPATION, UNSPECIFIED: ICD-10-CM

## 2022-03-07 DIAGNOSIS — F32.A DEPRESSION, UNSPECIFIED: ICD-10-CM

## 2022-03-07 DIAGNOSIS — G89.3 NEOPLASM RELATED PAIN (ACUTE) (CHRONIC): ICD-10-CM

## 2022-04-11 PROBLEM — Z11.59 SCREENING FOR VIRAL DISEASE: Status: ACTIVE | Noted: 2020-05-19

## 2022-05-01 NOTE — ED ADULT TRIAGE NOTE - ADDITIONAL SAFETY/BANDS...
History: Follow-up sleep apnea: does use CPAP machine consistently.  Denies any daytime fatigue  It is being monitored by sleep medicine in [place]  Assessment: established condition   Plan: Advised to continue using CPAP machine and follow-up with sleep medicine who is monitoring this condition   Additional Safety/Bands:

## 2024-02-27 NOTE — ED ADULT NURSE NOTE - NEURO MENTATION
Spoke with pt mother in regards to later appt time. Inform pt that she has the latest appt. Pt verbalized understanding.   
normal